# Patient Record
Sex: FEMALE | Race: WHITE | Employment: OTHER | URBAN - METROPOLITAN AREA
[De-identification: names, ages, dates, MRNs, and addresses within clinical notes are randomized per-mention and may not be internally consistent; named-entity substitution may affect disease eponyms.]

---

## 2017-03-06 ENCOUNTER — APPOINTMENT (OUTPATIENT)
Dept: PREADMISSION TESTING | Age: 77
End: 2017-03-06
Payer: MEDICARE

## 2017-03-06 ENCOUNTER — HOSPITAL ENCOUNTER (OUTPATIENT)
Dept: PREADMISSION TESTING | Age: 77
Discharge: HOME | End: 2017-03-06
Payer: MEDICARE

## 2017-03-06 VITALS
DIASTOLIC BLOOD PRESSURE: 84 MMHG | BODY MASS INDEX: 25.95 KG/M2 | SYSTOLIC BLOOD PRESSURE: 158 MMHG | WEIGHT: 141 LBS | HEART RATE: 80 BPM | TEMPERATURE: 98.4 F | HEIGHT: 62 IN

## 2017-03-06 LAB
ABO + RH BLD: NORMAL
ANION GAP BLD CALC-SCNC: 5 MMOL/L (ref 5–15)
APPEARANCE UR: CLEAR
BACTERIA URNS QL MICRO: NEGATIVE /HPF
BILIRUB UR QL: NEGATIVE
BLOOD GROUP ANTIBODIES SERPL: NORMAL
BUN SERPL-MCNC: 16 MG/DL (ref 6–20)
BUN/CREAT SERPL: 26 (ref 12–20)
CALCIUM SERPL-MCNC: 9.4 MG/DL (ref 8.5–10.1)
CHLORIDE SERPL-SCNC: 104 MMOL/L (ref 97–108)
CO2 SERPL-SCNC: 29 MMOL/L (ref 21–32)
COLOR UR: NORMAL
CREAT SERPL-MCNC: 0.62 MG/DL (ref 0.55–1.02)
EPITH CASTS URNS QL MICRO: NORMAL /LPF
ERYTHROCYTE [DISTWIDTH] IN BLOOD BY AUTOMATED COUNT: 13.7 % (ref 11.5–14.5)
EST. AVERAGE GLUCOSE BLD GHB EST-MCNC: 105 MG/DL
GLUCOSE SERPL-MCNC: 97 MG/DL (ref 65–100)
GLUCOSE UR STRIP.AUTO-MCNC: NEGATIVE MG/DL
HBA1C MFR BLD: 5.3 % (ref 4.2–6.3)
HCT VFR BLD AUTO: 43.1 % (ref 35–47)
HGB BLD-MCNC: 14.1 G/DL (ref 11.5–16)
HGB UR QL STRIP: NEGATIVE
HYALINE CASTS URNS QL MICRO: NORMAL /LPF (ref 0–5)
INR PPP: 1 (ref 0.9–1.1)
KETONES UR QL STRIP.AUTO: NEGATIVE MG/DL
LEUKOCYTE ESTERASE UR QL STRIP.AUTO: NEGATIVE
MCH RBC QN AUTO: 30.7 PG (ref 26–34)
MCHC RBC AUTO-ENTMCNC: 32.7 G/DL (ref 30–36.5)
MCV RBC AUTO: 93.7 FL (ref 80–99)
NITRITE UR QL STRIP.AUTO: NEGATIVE
PH UR STRIP: 7 [PH] (ref 5–8)
PLATELET # BLD AUTO: 269 K/UL (ref 150–400)
POTASSIUM SERPL-SCNC: 4.5 MMOL/L (ref 3.5–5.1)
PROT UR STRIP-MCNC: NEGATIVE MG/DL
PROTHROMBIN TIME: 10.4 SEC (ref 9–11.1)
RBC # BLD AUTO: 4.6 M/UL (ref 3.8–5.2)
RBC #/AREA URNS HPF: NORMAL /HPF (ref 0–5)
SODIUM SERPL-SCNC: 138 MMOL/L (ref 136–145)
SP GR UR REFRACTOMETRY: 1.01 (ref 1–1.03)
SPECIMEN EXP DATE BLD: NORMAL
UA: UC IF INDICATED,UAUC: NORMAL
UROBILINOGEN UR QL STRIP.AUTO: 0.2 EU/DL (ref 0.2–1)
WBC # BLD AUTO: 4 K/UL (ref 3.6–11)
WBC URNS QL MICRO: NORMAL /HPF (ref 0–4)

## 2017-03-06 PROCEDURE — 36415 COLL VENOUS BLD VENIPUNCTURE: CPT | Performed by: ORTHOPAEDIC SURGERY

## 2017-03-06 PROCEDURE — 81001 URINALYSIS AUTO W/SCOPE: CPT | Performed by: ORTHOPAEDIC SURGERY

## 2017-03-06 PROCEDURE — 86900 BLOOD TYPING SEROLOGIC ABO: CPT | Performed by: ORTHOPAEDIC SURGERY

## 2017-03-06 PROCEDURE — 85610 PROTHROMBIN TIME: CPT | Performed by: ORTHOPAEDIC SURGERY

## 2017-03-06 PROCEDURE — 80048 BASIC METABOLIC PNL TOTAL CA: CPT | Performed by: ORTHOPAEDIC SURGERY

## 2017-03-06 PROCEDURE — 85027 COMPLETE CBC AUTOMATED: CPT | Performed by: ORTHOPAEDIC SURGERY

## 2017-03-06 PROCEDURE — 83036 HEMOGLOBIN GLYCOSYLATED A1C: CPT | Performed by: ORTHOPAEDIC SURGERY

## 2017-03-06 RX ORDER — MELATONIN
DAILY
COMMUNITY
End: 2019-08-19

## 2017-03-06 RX ORDER — SIMVASTATIN 20 MG/1
TABLET, FILM COATED ORAL
COMMUNITY
End: 2017-06-11 | Stop reason: SDUPTHER

## 2017-03-06 NOTE — PERIOP NOTES
PREOPERATIVE INSTRUCTIONS REVIEWED WITH PATIENT. PATIENT GIVEN   CHG WIPES. INSTRUCTIONS TO BE REVIEWED IN CLASS ON USE OF CHG WIPES. PATIENT GIVEN SSI INFECTION SHEET AND ALSO  MRSA/MSSA TREATMENT INSTRUCTION SHEET  WITH AN EXPLANATION TO PATIENT THAT THEY WILL BE NOTIFIED IF TREATMENT INSTRUCTIONS NEED TO BE INITIATED. PATIENT WAS GIVEN THE OPPORTUNITY TO ASK QUESTIONS ON THE INFORMATION PROVIDED.

## 2017-03-07 LAB
BACTERIA SPEC CULT: ABNORMAL
BACTERIA SPEC CULT: ABNORMAL
SERVICE CMNT-IMP: ABNORMAL

## 2017-03-08 RX ORDER — MUPIROCIN 20 MG/G
OINTMENT TOPICAL 2 TIMES DAILY
Qty: 22 G | Refills: 0 | Status: SHIPPED | OUTPATIENT
Start: 2017-03-13 | End: 2017-03-23

## 2017-03-08 NOTE — ADVANCED PRACTICE NURSE
Patient was called (identity confirmed with 3 patient identifiers) and informed of positive MSSA, instructed to obtain mupirocin prescription and Chlorhexidine liquid soap from pharmacy per protocol. Written instructions given at time of Preadmission Testing were reinforced with patient. Patient was given an opportunity to have questions answered and contact information if needed.

## 2017-03-08 NOTE — PERIOP NOTES
MESSAGE LEFT FOR JASON IN DR. FALLON'S OFFICE RE: MSSA  RESULT AND INITIATION OF TREATMENT BY KINJAL HOLLAND N.P.

## 2017-03-20 RX ORDER — CELECOXIB 200 MG/1
200 CAPSULE ORAL ONCE
Status: CANCELLED | OUTPATIENT
Start: 2017-03-20 | End: 2017-03-20

## 2017-03-20 RX ORDER — DEXAMETHASONE SODIUM PHOSPHATE 100 MG/10ML
10 INJECTION INTRAMUSCULAR; INTRAVENOUS ONCE
Status: CANCELLED | OUTPATIENT
Start: 2017-03-20 | End: 2017-03-20

## 2017-03-20 RX ORDER — ACETAMINOPHEN 500 MG
1000 TABLET ORAL ONCE
Status: CANCELLED | OUTPATIENT
Start: 2017-03-20 | End: 2017-03-20

## 2017-03-20 RX ORDER — PREGABALIN 75 MG/1
75 CAPSULE ORAL ONCE
Status: CANCELLED | OUTPATIENT
Start: 2017-03-20 | End: 2017-03-20

## 2017-03-20 RX ORDER — CEFAZOLIN SODIUM IN 0.9 % NACL 2 G/50 ML
2 INTRAVENOUS SOLUTION, PIGGYBACK (ML) INTRAVENOUS ONCE
Status: CANCELLED | OUTPATIENT
Start: 2017-03-20 | End: 2017-03-20

## 2017-03-21 ENCOUNTER — ANESTHESIA (OUTPATIENT)
Dept: SURGERY | Age: 77
DRG: 470 | End: 2017-03-21
Payer: MEDICARE

## 2017-03-21 ENCOUNTER — ANESTHESIA EVENT (OUTPATIENT)
Dept: SURGERY | Age: 77
DRG: 470 | End: 2017-03-21
Payer: MEDICARE

## 2017-03-21 PROBLEM — M17.12 PRIMARY LOCALIZED OSTEOARTHRITIS OF LEFT KNEE: Status: ACTIVE | Noted: 2017-03-21

## 2017-03-21 PROCEDURE — 74011000258 HC RX REV CODE- 258

## 2017-03-21 PROCEDURE — 74011250636 HC RX REV CODE- 250/636: Performed by: ORTHOPAEDIC SURGERY

## 2017-03-21 PROCEDURE — 77030007866 HC KT SPN ANES BBMI -B: Performed by: ANESTHESIOLOGY

## 2017-03-21 PROCEDURE — 74011000250 HC RX REV CODE- 250

## 2017-03-21 PROCEDURE — 74011250636 HC RX REV CODE- 250/636

## 2017-03-21 PROCEDURE — 77030013079 HC BLNKT BAIR HGGR 3M -A: Performed by: ANESTHESIOLOGY

## 2017-03-21 RX ORDER — ONDANSETRON 2 MG/ML
INJECTION INTRAMUSCULAR; INTRAVENOUS AS NEEDED
Status: DISCONTINUED | OUTPATIENT
Start: 2017-03-21 | End: 2017-03-21 | Stop reason: HOSPADM

## 2017-03-21 RX ORDER — DEXAMETHASONE SODIUM PHOSPHATE 4 MG/ML
INJECTION, SOLUTION INTRA-ARTICULAR; INTRALESIONAL; INTRAMUSCULAR; INTRAVENOUS; SOFT TISSUE AS NEEDED
Status: DISCONTINUED | OUTPATIENT
Start: 2017-03-21 | End: 2017-03-21 | Stop reason: HOSPADM

## 2017-03-21 RX ORDER — SODIUM CHLORIDE, SODIUM LACTATE, POTASSIUM CHLORIDE, CALCIUM CHLORIDE 600; 310; 30; 20 MG/100ML; MG/100ML; MG/100ML; MG/100ML
INJECTION, SOLUTION INTRAVENOUS
Status: DISCONTINUED | OUTPATIENT
Start: 2017-03-21 | End: 2017-03-21 | Stop reason: HOSPADM

## 2017-03-21 RX ORDER — GLYCOPYRROLATE 0.2 MG/ML
INJECTION INTRAMUSCULAR; INTRAVENOUS AS NEEDED
Status: DISCONTINUED | OUTPATIENT
Start: 2017-03-21 | End: 2017-03-21 | Stop reason: HOSPADM

## 2017-03-21 RX ORDER — PROPOFOL 10 MG/ML
INJECTION, EMULSION INTRAVENOUS AS NEEDED
Status: DISCONTINUED | OUTPATIENT
Start: 2017-03-21 | End: 2017-03-21 | Stop reason: HOSPADM

## 2017-03-21 RX ORDER — PROPOFOL 10 MG/ML
INJECTION, EMULSION INTRAVENOUS
Status: DISCONTINUED | OUTPATIENT
Start: 2017-03-21 | End: 2017-03-21 | Stop reason: HOSPADM

## 2017-03-21 RX ORDER — BUPIVACAINE HYDROCHLORIDE 5 MG/ML
INJECTION, SOLUTION EPIDURAL; INTRACAUDAL AS NEEDED
Status: DISCONTINUED | OUTPATIENT
Start: 2017-03-21 | End: 2017-03-21 | Stop reason: HOSPADM

## 2017-03-21 RX ADMIN — PROPOFOL 30 MG: 10 INJECTION, EMULSION INTRAVENOUS at 09:22

## 2017-03-21 RX ADMIN — CEFAZOLIN 2 G: 1 INJECTION, POWDER, FOR SOLUTION INTRAMUSCULAR; INTRAVENOUS; PARENTERAL at 07:52

## 2017-03-21 RX ADMIN — SODIUM CHLORIDE, SODIUM LACTATE, POTASSIUM CHLORIDE, CALCIUM CHLORIDE: 600; 310; 30; 20 INJECTION, SOLUTION INTRAVENOUS at 07:27

## 2017-03-21 RX ADMIN — PROPOFOL 20 MG: 10 INJECTION, EMULSION INTRAVENOUS at 09:03

## 2017-03-21 RX ADMIN — PROPOFOL 50 MCG/KG/MIN: 10 INJECTION, EMULSION INTRAVENOUS at 07:41

## 2017-03-21 RX ADMIN — DEXAMETHASONE SODIUM PHOSPHATE 10 MG: 4 INJECTION, SOLUTION INTRA-ARTICULAR; INTRALESIONAL; INTRAMUSCULAR; INTRAVENOUS; SOFT TISSUE at 07:53

## 2017-03-21 RX ADMIN — GLYCOPYRROLATE 0.2 MG: 0.2 INJECTION INTRAMUSCULAR; INTRAVENOUS at 08:51

## 2017-03-21 RX ADMIN — PROPOFOL 20 MG: 10 INJECTION, EMULSION INTRAVENOUS at 07:37

## 2017-03-21 RX ADMIN — ONDANSETRON 4 MG: 2 INJECTION INTRAMUSCULAR; INTRAVENOUS at 09:27

## 2017-03-21 RX ADMIN — PROPOFOL 10 MG: 10 INJECTION, EMULSION INTRAVENOUS at 07:38

## 2017-03-21 RX ADMIN — BUPIVACAINE HYDROCHLORIDE 15 MG: 5 INJECTION, SOLUTION EPIDURAL; INTRACAUDAL at 07:40

## 2017-03-21 RX ADMIN — PROPOFOL 10 MG: 10 INJECTION, EMULSION INTRAVENOUS at 07:42

## 2017-03-21 RX ADMIN — PROPOFOL 30 MG: 10 INJECTION, EMULSION INTRAVENOUS at 09:20

## 2017-03-21 RX ADMIN — SODIUM CHLORIDE, SODIUM LACTATE, POTASSIUM CHLORIDE, CALCIUM CHLORIDE: 600; 310; 30; 20 INJECTION, SOLUTION INTRAVENOUS at 09:23

## 2017-03-21 RX ADMIN — PROPOFOL 10 MG: 10 INJECTION, EMULSION INTRAVENOUS at 07:39

## 2017-03-21 RX ADMIN — PROPOFOL 30 MG: 10 INJECTION, EMULSION INTRAVENOUS at 09:25

## 2017-03-21 NOTE — ANESTHESIA POSTPROCEDURE EVALUATION
Post-Anesthesia Evaluation and Assessment    Patient: Davion Villa MRN: [id number]  SSN: [id number]    YOB: 1940  Age: 64 y.o. Sex: female       Cardiovascular Function/Vital Signs  Visit Vitals    /65    Pulse (!) 102    Temp 36.4 °C (97.5 °F)    Resp 18    Ht 5' 2\" (1.575 m)    Wt 64 kg (141 lb)    SpO2 99%    BMI 25.79 kg/m2       Patient is status post spinal anesthesia for Procedure(s):  LEFT TOTAL KNEE ARTHROPLASTY (SPINAL W/ IV SEDATION). Nausea/Vomiting: None    Postoperative hydration reviewed and adequate. Pain:  Pain Scale 1: Numeric (0 - 10) (03/21/17 0942)  Pain Intensity 1: 0 (03/21/17 0942)   Managed    Neurological Status:   Neuro (WDL): Exceptions to WDL (03/21/17 0942)  Neuro  Neurologic State: Alert (03/21/17 0942)  LUE Motor Response: Purposeful (03/21/17 0942)  LLE Motor Response: Pharmacologically paralyzed (03/21/17 0942)  RUE Motor Response: Purposeful (03/21/17 0942)  RLE Motor Response: Pharmacologically paralyzed (03/21/17 0942)   At baseline    Mental Status and Level of Consciousness: Arousable    Pulmonary Status:   O2 Device: CO2 nasal cannula;Oral airway (03/21/17 0949)   Adequate oxygenation and airway patent    Complications related to anesthesia: None    Post-anesthesia assessment completed.  No concerns    Signed By: Godfrey Mendez MD     March 21, 2017

## 2017-03-21 NOTE — ANESTHESIA PREPROCEDURE EVALUATION
Anesthetic History   No history of anesthetic complications            Review of Systems / Medical History  Patient summary reviewed, nursing notes reviewed and pertinent labs reviewed    Pulmonary  Within defined limits                 Neuro/Psych   Within defined limits           Cardiovascular  Within defined limits                Exercise tolerance: >4 METS     GI/Hepatic/Renal  Within defined limits   GERD      PUD     Endo/Other  Within defined limits      Arthritis and cancer     Other Findings              Physical Exam    Airway  Mallampati: II  TM Distance: 4 - 6 cm  Neck ROM: normal range of motion   Mouth opening: Normal     Cardiovascular  Regular rate and rhythm,  S1 and S2 normal,  no murmur, click, rub, or gallop             Dental  No notable dental hx       Pulmonary  Breath sounds clear to auscultation               Abdominal  GI exam deferred       Other Findings            Anesthetic Plan    ASA: 2  Anesthesia type: spinal      Post-op pain plan if not by surgeon: peripheral nerve block single    Induction: Intravenous  Anesthetic plan and risks discussed with: Patient

## 2017-03-21 NOTE — ANESTHESIA PROCEDURE NOTES
Peripheral Block    Start time: 3/21/2017 7:19 AM  End time: 3/21/2017 7:29 AM  Performed by: Jeremías Lambert  Authorized by: Jeremías Lambert       Pre-procedure: Indications: at surgeon's request and post-op pain management    Preanesthetic Checklist: patient identified, risks and benefits discussed, site marked, timeout performed, anesthesia consent given and patient being monitored      Block Type:   Block Type:   Adductor canal  Monitoring:  Standard ASA monitoring, responsive to questions, oxygen, continuous pulse ox, frequent vital sign checks and heart rate  Injection Technique:  Single shot  Procedures: ultrasound guided    Patient Position: supine  Prep: chlorhexidine    Location:  Mid thigh  Needle Type:  Stimuplex  Needle Gauge:  22 G  Needle Localization:  Ultrasound guidance  Medication Injected:  0.5%  ropivacaine    Assessment:  Number of attempts:  1  Injection Assessment:  Incremental injection every 5 mL, local visualized surrounding nerve on ultrasound, negative aspiration for blood, no intravascular symptoms, no paresthesia and ultrasound image on chart  Patient tolerance:  Patient tolerated the procedure well with no immediate complications

## 2017-03-22 ENCOUNTER — HOME HEALTH ADMISSION (OUTPATIENT)
Dept: HOME HEALTH SERVICES | Facility: HOME HEALTH | Age: 77
End: 2017-03-22
Payer: MEDICARE

## 2017-03-24 ENCOUNTER — HOME CARE VISIT (OUTPATIENT)
Dept: SCHEDULING | Facility: HOME HEALTH | Age: 77
End: 2017-03-24
Payer: MEDICARE

## 2017-03-24 ENCOUNTER — NURSE NAVIGATOR (OUTPATIENT)
Dept: OTHER | Age: 77
End: 2017-03-24

## 2017-03-24 VITALS
TEMPERATURE: 98 F | HEART RATE: 80 BPM | SYSTOLIC BLOOD PRESSURE: 118 MMHG | RESPIRATION RATE: 20 BRPM | OXYGEN SATURATION: 97 % | DIASTOLIC BLOOD PRESSURE: 68 MMHG

## 2017-03-24 PROCEDURE — G0151 HHCP-SERV OF PT,EA 15 MIN: HCPCS

## 2017-03-24 PROCEDURE — 3331090002 HH PPS REVENUE DEBIT

## 2017-03-24 PROCEDURE — 3331090001 HH PPS REVENUE CREDIT

## 2017-03-24 PROCEDURE — G0299 HHS/HOSPICE OF RN EA 15 MIN: HCPCS

## 2017-03-24 PROCEDURE — 400013 HH SOC

## 2017-03-24 NOTE — PROGRESS NOTES
This note will not be viewable in [address] Post Discharge Follow-up contact after Joint Replacement    Patient discharged on 3/23/17  By  Wero Cintron   following  left knee Arthroplasty. Spoke with patient today, who reports they are \" working with physical therapy right now. I am just surprised by the bruising\"  Denies Fever, Shortness of Breath or Chest Pain. Home Health has visited. Patient also reports:. Incision  clean, dry, intact  Calf is non-tender,   operative extremity has minimal swelling. Pain is well managed. Discussed use of ice & elevation. is progressing with therapy and isexercising on own. Taking Aspirin for anticoagulation, Tramadol for pain. Patient   is not experiencing symptoms of constipation & urinating without difficulty. Discussed side effects of anticoagulants & pain medications (bleeding/bruising, constipation, lightheaded/dizziness)  Follow up appointment is not scheduled; but patient states plan to schedule. Discussed calling surgeon Dr Ronnie Jimenez  for drainage, bleeding, swelling in operative extremity, fever or pain. Discussed calling PCP Dr Laila Renteria with other medical issues.

## 2017-03-25 PROCEDURE — 3331090002 HH PPS REVENUE DEBIT

## 2017-03-25 PROCEDURE — 3331090001 HH PPS REVENUE CREDIT

## 2017-03-26 PROCEDURE — 3331090002 HH PPS REVENUE DEBIT

## 2017-03-26 PROCEDURE — 3331090001 HH PPS REVENUE CREDIT

## 2017-03-27 ENCOUNTER — HOME CARE VISIT (OUTPATIENT)
Dept: SCHEDULING | Facility: HOME HEALTH | Age: 77
End: 2017-03-27
Payer: MEDICARE

## 2017-03-27 VITALS
TEMPERATURE: 97.9 F | DIASTOLIC BLOOD PRESSURE: 76 MMHG | OXYGEN SATURATION: 98 % | RESPIRATION RATE: 24 BRPM | HEART RATE: 73 BPM | SYSTOLIC BLOOD PRESSURE: 124 MMHG

## 2017-03-27 PROCEDURE — 3331090001 HH PPS REVENUE CREDIT

## 2017-03-27 PROCEDURE — G0151 HHCP-SERV OF PT,EA 15 MIN: HCPCS

## 2017-03-27 PROCEDURE — 3331090002 HH PPS REVENUE DEBIT

## 2017-03-28 PROCEDURE — 3331090002 HH PPS REVENUE DEBIT

## 2017-03-28 PROCEDURE — 3331090001 HH PPS REVENUE CREDIT

## 2017-03-29 ENCOUNTER — HOME CARE VISIT (OUTPATIENT)
Dept: SCHEDULING | Facility: HOME HEALTH | Age: 77
End: 2017-03-29
Payer: MEDICARE

## 2017-03-29 VITALS
SYSTOLIC BLOOD PRESSURE: 127 MMHG | DIASTOLIC BLOOD PRESSURE: 78 MMHG | TEMPERATURE: 98.1 F | HEART RATE: 83 BPM | RESPIRATION RATE: 20 BRPM | OXYGEN SATURATION: 98 %

## 2017-03-29 PROCEDURE — 3331090002 HH PPS REVENUE DEBIT

## 2017-03-29 PROCEDURE — G0151 HHCP-SERV OF PT,EA 15 MIN: HCPCS

## 2017-03-29 PROCEDURE — 3331090001 HH PPS REVENUE CREDIT

## 2017-03-30 PROCEDURE — 3331090001 HH PPS REVENUE CREDIT

## 2017-03-30 PROCEDURE — 3331090002 HH PPS REVENUE DEBIT

## 2017-03-31 ENCOUNTER — HOME CARE VISIT (OUTPATIENT)
Dept: SCHEDULING | Facility: HOME HEALTH | Age: 77
End: 2017-03-31
Payer: MEDICARE

## 2017-03-31 VITALS
TEMPERATURE: 98.3 F | OXYGEN SATURATION: 98 % | HEART RATE: 98 BPM | SYSTOLIC BLOOD PRESSURE: 125 MMHG | DIASTOLIC BLOOD PRESSURE: 82 MMHG | RESPIRATION RATE: 24 BRPM

## 2017-03-31 PROCEDURE — 3331090001 HH PPS REVENUE CREDIT

## 2017-03-31 PROCEDURE — 3331090002 HH PPS REVENUE DEBIT

## 2017-03-31 PROCEDURE — G0151 HHCP-SERV OF PT,EA 15 MIN: HCPCS

## 2017-04-01 PROCEDURE — 3331090001 HH PPS REVENUE CREDIT

## 2017-04-01 PROCEDURE — 3331090002 HH PPS REVENUE DEBIT

## 2017-04-02 PROCEDURE — 3331090001 HH PPS REVENUE CREDIT

## 2017-04-02 PROCEDURE — 3331090002 HH PPS REVENUE DEBIT

## 2017-04-03 ENCOUNTER — HOME CARE VISIT (OUTPATIENT)
Dept: SCHEDULING | Facility: HOME HEALTH | Age: 77
End: 2017-04-03
Payer: MEDICARE

## 2017-04-03 VITALS
HEART RATE: 78 BPM | HEIGHT: 62 IN | WEIGHT: 147 LBS | DIASTOLIC BLOOD PRESSURE: 60 MMHG | OXYGEN SATURATION: 98 % | TEMPERATURE: 97.8 F | SYSTOLIC BLOOD PRESSURE: 116 MMHG | BODY MASS INDEX: 27.05 KG/M2 | RESPIRATION RATE: 20 BRPM

## 2017-04-03 VITALS
SYSTOLIC BLOOD PRESSURE: 130 MMHG | TEMPERATURE: 98.1 F | OXYGEN SATURATION: 98 % | RESPIRATION RATE: 20 BRPM | DIASTOLIC BLOOD PRESSURE: 80 MMHG | HEART RATE: 89 BPM

## 2017-04-03 PROCEDURE — 3331090001 HH PPS REVENUE CREDIT

## 2017-04-03 PROCEDURE — 3331090002 HH PPS REVENUE DEBIT

## 2017-04-03 PROCEDURE — G0151 HHCP-SERV OF PT,EA 15 MIN: HCPCS

## 2017-04-04 PROCEDURE — 3331090001 HH PPS REVENUE CREDIT

## 2017-04-04 PROCEDURE — 3331090002 HH PPS REVENUE DEBIT

## 2017-04-05 ENCOUNTER — HOME CARE VISIT (OUTPATIENT)
Dept: SCHEDULING | Facility: HOME HEALTH | Age: 77
End: 2017-04-05
Payer: MEDICARE

## 2017-04-05 VITALS
OXYGEN SATURATION: 98 % | DIASTOLIC BLOOD PRESSURE: 88 MMHG | RESPIRATION RATE: 21 BRPM | HEART RATE: 89 BPM | SYSTOLIC BLOOD PRESSURE: 130 MMHG | TEMPERATURE: 98.8 F

## 2017-04-05 PROCEDURE — 3331090002 HH PPS REVENUE DEBIT

## 2017-04-05 PROCEDURE — 3331090001 HH PPS REVENUE CREDIT

## 2017-04-05 PROCEDURE — G0151 HHCP-SERV OF PT,EA 15 MIN: HCPCS

## 2017-04-06 PROCEDURE — 3331090001 HH PPS REVENUE CREDIT

## 2017-04-06 PROCEDURE — 3331090002 HH PPS REVENUE DEBIT

## 2017-04-07 ENCOUNTER — HOME CARE VISIT (OUTPATIENT)
Dept: SCHEDULING | Facility: HOME HEALTH | Age: 77
End: 2017-04-07
Payer: MEDICARE

## 2017-04-07 VITALS
HEART RATE: 84 BPM | TEMPERATURE: 98.5 F | RESPIRATION RATE: 22 BRPM | DIASTOLIC BLOOD PRESSURE: 79 MMHG | OXYGEN SATURATION: 98 % | SYSTOLIC BLOOD PRESSURE: 127 MMHG

## 2017-04-07 PROCEDURE — G0151 HHCP-SERV OF PT,EA 15 MIN: HCPCS

## 2017-04-07 PROCEDURE — 3331090001 HH PPS REVENUE CREDIT

## 2017-04-07 PROCEDURE — 3331090002 HH PPS REVENUE DEBIT

## 2017-04-08 PROCEDURE — 3331090001 HH PPS REVENUE CREDIT

## 2017-04-08 PROCEDURE — 3331090002 HH PPS REVENUE DEBIT

## 2017-04-09 PROCEDURE — 3331090002 HH PPS REVENUE DEBIT

## 2017-04-09 PROCEDURE — 3331090001 HH PPS REVENUE CREDIT

## 2017-04-10 ENCOUNTER — HOME CARE VISIT (OUTPATIENT)
Dept: SCHEDULING | Facility: HOME HEALTH | Age: 77
End: 2017-04-10
Payer: MEDICARE

## 2017-04-10 VITALS
OXYGEN SATURATION: 98 % | RESPIRATION RATE: 22 BRPM | HEART RATE: 81 BPM | SYSTOLIC BLOOD PRESSURE: 127 MMHG | DIASTOLIC BLOOD PRESSURE: 76 MMHG | TEMPERATURE: 98.7 F

## 2017-04-10 PROCEDURE — 3331090002 HH PPS REVENUE DEBIT

## 2017-04-10 PROCEDURE — G0151 HHCP-SERV OF PT,EA 15 MIN: HCPCS

## 2017-04-10 PROCEDURE — 3331090001 HH PPS REVENUE CREDIT

## 2017-04-11 PROCEDURE — 3331090001 HH PPS REVENUE CREDIT

## 2017-04-11 PROCEDURE — 3331090002 HH PPS REVENUE DEBIT

## 2017-04-12 ENCOUNTER — HOME CARE VISIT (OUTPATIENT)
Dept: SCHEDULING | Facility: HOME HEALTH | Age: 77
End: 2017-04-12
Payer: MEDICARE

## 2017-04-12 PROCEDURE — 3331090001 HH PPS REVENUE CREDIT

## 2017-04-12 PROCEDURE — G0151 HHCP-SERV OF PT,EA 15 MIN: HCPCS

## 2017-04-12 PROCEDURE — 3331090002 HH PPS REVENUE DEBIT

## 2017-04-13 VITALS
TEMPERATURE: 98.5 F | DIASTOLIC BLOOD PRESSURE: 84 MMHG | SYSTOLIC BLOOD PRESSURE: 120 MMHG | RESPIRATION RATE: 24 BRPM | HEART RATE: 83 BPM | OXYGEN SATURATION: 98 %

## 2017-04-13 PROCEDURE — 3331090002 HH PPS REVENUE DEBIT

## 2017-04-13 PROCEDURE — 3331090001 HH PPS REVENUE CREDIT

## 2017-04-14 ENCOUNTER — HOME CARE VISIT (OUTPATIENT)
Dept: SCHEDULING | Facility: HOME HEALTH | Age: 77
End: 2017-04-14
Payer: MEDICARE

## 2017-04-14 VITALS
DIASTOLIC BLOOD PRESSURE: 80 MMHG | TEMPERATURE: 98.4 F | RESPIRATION RATE: 21 BRPM | HEART RATE: 78 BPM | SYSTOLIC BLOOD PRESSURE: 123 MMHG | OXYGEN SATURATION: 98 %

## 2017-04-14 PROCEDURE — G0151 HHCP-SERV OF PT,EA 15 MIN: HCPCS

## 2017-04-14 PROCEDURE — 3331090001 HH PPS REVENUE CREDIT

## 2017-04-14 PROCEDURE — 3331090002 HH PPS REVENUE DEBIT

## 2017-04-15 PROCEDURE — 3331090002 HH PPS REVENUE DEBIT

## 2017-04-15 PROCEDURE — 3331090001 HH PPS REVENUE CREDIT

## 2017-04-16 PROCEDURE — 3331090002 HH PPS REVENUE DEBIT

## 2017-04-16 PROCEDURE — 3331090001 HH PPS REVENUE CREDIT

## 2017-04-17 ENCOUNTER — HOME CARE VISIT (OUTPATIENT)
Dept: SCHEDULING | Facility: HOME HEALTH | Age: 77
End: 2017-04-17
Payer: MEDICARE

## 2017-04-17 VITALS
HEART RATE: 78 BPM | OXYGEN SATURATION: 98 % | SYSTOLIC BLOOD PRESSURE: 126 MMHG | TEMPERATURE: 98.3 F | DIASTOLIC BLOOD PRESSURE: 84 MMHG | RESPIRATION RATE: 22 BRPM

## 2017-04-17 PROCEDURE — 3331090003 HH PPS REVENUE ADJ

## 2017-04-17 PROCEDURE — G0151 HHCP-SERV OF PT,EA 15 MIN: HCPCS

## 2017-04-17 PROCEDURE — 3331090001 HH PPS REVENUE CREDIT

## 2017-04-17 PROCEDURE — 3331090002 HH PPS REVENUE DEBIT

## 2017-04-27 ENCOUNTER — HOSPITAL ENCOUNTER (OUTPATIENT)
Dept: PHYSICAL THERAPY | Age: 77
Discharge: HOME | End: 2017-04-27
Payer: MEDICARE

## 2017-04-27 PROCEDURE — 97161 PT EVAL LOW COMPLEX 20 MIN: CPT | Performed by: PHYSICAL THERAPIST

## 2017-04-27 PROCEDURE — G8979 MOBILITY GOAL STATUS: HCPCS | Performed by: PHYSICAL THERAPIST

## 2017-04-27 PROCEDURE — G8978 MOBILITY CURRENT STATUS: HCPCS | Performed by: PHYSICAL THERAPIST

## 2017-04-27 PROCEDURE — 97110 THERAPEUTIC EXERCISES: CPT | Performed by: PHYSICAL THERAPIST

## 2017-04-27 PROCEDURE — 97016 VASOPNEUMATIC DEVICE THERAPY: CPT | Performed by: PHYSICAL THERAPIST

## 2017-04-27 PROCEDURE — 97140 MANUAL THERAPY 1/> REGIONS: CPT | Performed by: PHYSICAL THERAPIST

## 2017-04-27 NOTE — PROGRESS NOTES
[facility]  [address] [address], [address] [place] [address]  Phone: [phone]  Fax: [phone]      Plan of Care/Statement of Necessity for Physical Therapy Services  2-15    Patient name: Mihaela Chadwick  : 1940  Provider#: [id number]  Referral source: Selina Brumfield MD      Medical/Treatment Diagnosis: Pain in left knee [M25.562]     Prior Hospitalization: see medical history     Comorbidities: osteoporosis, OA  Prior Level of Function: hiking frequently  Medications: Verified on Patient Summary List  Start of Care: 17      Onset Date: DOS: 3/21/17  The Plan of Care and following information is based on the information from the initial evaluation. Assessment/ key information: The pt is a 64 y.o. Female referred for the evaluation and treatment s/p left knee TKA on 3/21/17 by Dr. Chauncey Claude. The pt presents with impairments including decreased rom, strength, balance and joint mobility s/p surgical procedure. She is ambulating with a single point cane with decreased heel strike and limitation into knee extension. The pt would benefit from skilled physical therapy in order to address these impairments and to return her/him to maximal level of function pain free.       Evaluation Complexity History MEDIUM  Complexity : 1-2 comorbidities / personal factors will impact the outcome/ POC ; Examination LOW Complexity : 1-2 Standardized tests and measures addressing body structure, function, activity limitation and / or participation in recreation  ;Presentation LOW Complexity : Stable, uncomplicated  ;Clinical Decision Making MEDIUM Complexity : FOTO score of 26-74  Overall Complexity Rating: LOW     Problem List: pain affecting function, decrease ROM, decrease strength, edema affecting function, impaired gait/ balance, decrease ADL/ functional abilitiies, decrease activity tolerance and decrease flexibility/ joint mobility   Treatment Plan may include any combination of the following: Therapeutic exercise, Neuromuscular re-education, Physical agent/modality, Gait/balance training, Manual therapy, Patient education, Home safety training and Stair training  Patient / Family readiness to learn indicated by: asking questions, trying to perform skills and interest  Persons(s) to be included in education: patient (P)  Barriers to Learning/Limitations: None  Patient Goal (s): be able to go hiking  Patient Self Reported Health Status: good  Rehabilitation Potential: excellent    Short Term Goals: To be accomplished in 4 weeks:  1) Pt will be able toNavigate 3 flights of stairs without pain   2) Pt will be able to Ambulate on uneven terrain without pain or instability without her cane   3) Pt will be able to Ambulate >/= 2 miles without pain   4) Pt will demonstrate Knee flexion >/= 120 degrees to aid in sitting/squatting  Long Term Goals: To be accomplished in 8 weeks:  1) Pt will be able toNavigate 5 flights of stairs without pain   2) Pt will be able to Ambulate on an incline of 1- 2% without pain or instability without her cane   3) Pt will be able to Ambulate >/= 3 miles without pain   4) Pt will demonstrate Knee flexion >/= 125 degrees to aid in sitting/squatting   Frequency / Duration: Patient to be seen 2-3 times per week for 8 weeks. Patient/ Caregiver education and instruction: activity modification and exercises    [x]  Plan of care has been reviewed with PTA    G-Codes (GP)  Mobility   Current  CK= 40-59%   Goal  CJ= 20-39%      The severity rating is based on clinical judgment and the FOTO Score score. Certification Period: 4/27/17-5/27/17  Melly May 4/27/2017 1:59 PM    ________________________________________________________________________    I certify that the above Therapy Services are being furnished while the patient is under my care. I agree with the treatment plan and certify that this therapy is necessary.     Physician's Signature:____________________ Date:____________Time: _________

## 2017-04-27 NOTE — PROGRESS NOTES
PT INITIAL EVALUATION NOTE - [facility] 2-15    Patient Name: Eros Smith  Date:2017  : 1940  [x]  Patient  Verified  Payor: Jose Truong / Plan: Megan Salgado PPO / Product Type: PPO /    In time:12:30p  Out time:1:40p  Total Treatment Time (min):  70  Total Timed Codes (min): 60  1:1 Treatment Time ( only): 60  Visit #: 1      Treatment Area: Pain in left knee [M25.562]    SUBJECTIVE  Pain Level (0-10 scale): 1/10  Any medication changes, allergies to medications, adverse drug reactions, diagnosis change, or new procedure performed?: [] No    [x] Yes (see summary sheet for update)  Subjective: The pt had TKA on 3/21/17 by Dr. Zhanna Roberts. No complications noted per pt. The pt reports she had PT at home and went well. She lives in [address] [place] [address] and has about 17 steps to enter home and is getting better using her left leg with these. The pt has been walking 1 mile with her  and has done it without the cane 1-2 times, has more trouble standing still. Last measurement was 104 degrees with home health. She has continued exercises on her own. PLOF: hiking trails before knee was too bad. esMechanism of Injury: OA  Previous Treatment/Compliance: had PT prior to surgery. PMHx/Surgical Hx: TKA left knee 3/21/17  Work Hx: retired  Living Situation: home with  and  Pt Goals: pt wishes to be able to walk 4-5 miles. [email or web address]. Barriers: -  Motivation: high  Substance use: none  FABQ Score: Cognition: A & O x 4      OBJECTIVE/EXAMINATION  Observations: Pt shows good incision healing with skin glue gradually pealing off. Increased girth in knee L > R  Gait and Functional Mobility: decreased heel strike due to lack of full extension, walking with Single point cane community distances. Palpation: tenderness along medial lateral incision and posterior knee.      Right Knee ROM: AROM  0/0/125   Left Knee ROM: AROM  0/10/109       Joint Mobility Assessment: PFJ limited    Flexibility: decreased gastroc and soleus flexibility limiting extension. MMT: 4/5 left LE, 5/5 right LE. Neurological: Reflexes / Sensations: intact          Modality rationale: decrease edema, decrease inflammation and decrease pain to improve the patients ability to ambulate without limtiations   Min Type Additional Details    [] Estim: []Att   []Unatt        []TENS instruct                  []IFC  []Premod   []NMES                     []Other:  []w/US   []w/ice   []w/heat  Position:  Location:    []  Traction: [] Cervical       []Lumbar                       [] Prone          []Supine                       []Intermittent   []Continuous Lbs:  [] before manual  [] after manual  []w/heat    []  Ultrasound: []Continuous   [] Pulsed at:                           []1MHz   []3MHz Location:  W/cm2:    [] Paraffin         Location:   []w/heat    []  Ice     []  Heat  []  Ice massage Position:  Location:    []  Laser  []  Other: Position:  Location:   10   [x]  Vasopneumatic Device Pressure:       [x] lo [] med [] hi   Temperature: 45     [x] Skin assessment post-treatment:  [x]intact []redness- no adverse reaction    []redness  adverse reaction:     20 min Therapeutic Exercise:  [x] See flow sheet :   Rationale: increase ROM, increase strength, improve coordination, improve balance and increase proprioception to improve the patients ability to return to hiking. 10 min Manual Therapy: manual PFJ mobilizations and posterior knee STM to address mobility and extension. Rationale: decrease pain, increase ROM and increase tissue extensibility to improve the patients ability to return to hiking pain free.        With   [] TE   [] TA   [] neuro   [] other: Patient Education: [x] Review HEP    [] Progressed/Changed HEP based on:   [] positioning   [] body mechanics   [] transfers   [] heat/ice application    [] other:      Other Objective/Functional Measures:FOTO 49 GOAL 60    Pain Level (0-10 scale) post treatment: 2/10    ASSESSMENT/Changes in Function:     [x]  See Plan of Casey Duarte 4/27/2017  12:28 PM

## 2017-05-03 ENCOUNTER — HOSPITAL ENCOUNTER (OUTPATIENT)
Dept: PHYSICAL THERAPY | Age: 77
Discharge: HOME | End: 2017-05-03
Payer: MEDICARE

## 2017-05-03 PROCEDURE — 97110 THERAPEUTIC EXERCISES: CPT | Performed by: PHYSICAL THERAPIST

## 2017-05-03 PROCEDURE — 97016 VASOPNEUMATIC DEVICE THERAPY: CPT | Performed by: PHYSICAL THERAPIST

## 2017-05-03 PROCEDURE — 97140 MANUAL THERAPY 1/> REGIONS: CPT | Performed by: PHYSICAL THERAPIST

## 2017-05-03 NOTE — PROGRESS NOTES
PT DAILY TREATMENT NOTE - [facility] 2-15    Patient Name: Tyler Dobson  Date:5/3/2017  : 1940  [x]  Patient  Verified  Payor: Freddy Smith / Plan: Deja Prophet PPO / Product Type: PPO /    In time:10:00am  Out time:11:15am  Total Treatment Time (min): 75  Total Timed Codes (min): 45  1:1 Treatment Time [facility] only):45  Visit #: 2    Treatment Area: Pain in left knee [M25.562]    SUBJECTIVE  Pain Level (0-10 scale): 3-4/10  Any medication changes, allergies to medications, adverse drug reactions, diagnosis change, or new procedure performed?: [x] No    [] Yes (see summary sheet for update)  Subjective functional status/changes:   [] No changes reported  Doing well, was up on my feet a lot yesterday so I am a little sore and swollen this morning.      OBJECTIVE              Modality rationale: decrease edema, decrease inflammation and decrease pain to improve the patients ability to ambulate without limtiations   Min Type Additional Details     [] Estim: []Att []Unatt []TENS instruct  []IFC []Premod []NMES   []Other:  []w/US []w/ice []w/heat  Position:  Location:     [] Traction: [] Cervical []Lumbar  [] Prone []Supine  []Intermittent []Continuous Lbs:  [] before manual  [] after manual  []w/heat     [] Ultrasound: []Continuous [] Pulsed at:  []1MHz []3MHz Location:  W/cm2:     [] Paraffin      Location:   []w/heat     [] Ice [] Heat  [] Ice massage Position:  Location:     [] Laser  [] Other: Position:  Location:   15 [x] Vasopneumatic Device Pressure: [x] lo [] med [] hi   Temperature: 45      [x] Skin assessment post-treatment: [x]intact []redness- no adverse reaction  []redness  adverse reaction:      20 min Therapeutic Exercise: [x] See flow sheet :   Rationale: increase ROM, increase strength, improve coordination, improve balance and increase proprioception to improve the patients ability to return to hiking.      10 min Manual Therapy: manual PFJ mobilizations and posterior knee STM to address mobility and extension. Rationale: decrease pain, increase ROM and increase tissue extensibility to improve the patients ability to return to hiking pain free.         With  [] TE  [] TA  [] neuro  [] other: Patient Education: [x] Review HEP   [] Progressed/Changed HEP based on:   [] positioning [] body mechanics [] transfers [] heat/ice application   [] other:           Other Objective/Functional Measures: -     Pain Level (0-10 scale) post treatment: 1/10    ASSESSMENT/Changes in Function:   Pt tolerated session well with no increase in pain and progression with strengthening exercises. Patient will continue to benefit from skilled PT services to modify and progress therapeutic interventions, address functional mobility deficits, address ROM deficits, address strength deficits, analyze and address soft tissue restrictions, analyze and cue movement patterns, analyze and modify body mechanics/ergonomics, assess and modify postural abnormalities, address imbalance/dizziness and instruct in home and community integration to attain remaining goals. [x]  See Plan of Care  []  See progress note/recertification  []  See Discharge Summary         Progress towards goals / Updated goals:  Short Term Goals: To be accomplished in 4 weeks:  1) Pt will be able to Navigate 3 flights of stairs without pain   2) Pt will be able to Ambulate on uneven terrain without pain or instability without her cane   3) Pt will be able to Ambulate >/= 2 miles without pain   4) Pt will demonstrate Knee flexion >/= 120 degrees to aid in sitting/squatting  Long Term Goals:  To be accomplished in 8 weeks:  1) Pt will be able toNavigate 5 flights of stairs without pain   2) Pt will be able to Ambulate on an incline of 1- 2% without pain or instability without her cane   3) Pt will be able to Ambulate >/= 3 miles without pain   4) Pt will demonstrate Knee flexion >/= 125 degrees to aid in sitting/squatting   Frequency / Duration: Patient to be seen 2-3 times per week for 8 weeks.       PLAN  []  Upgrade activities as tolerated     []  Continue plan of care  []  Update interventions per flow sheet       []  Discharge due to:_  []  Other:_      Rekha Main 5/3/2017  11:25 AM

## 2017-05-05 ENCOUNTER — HOSPITAL ENCOUNTER (OUTPATIENT)
Dept: PHYSICAL THERAPY | Age: 77
Discharge: HOME | End: 2017-05-05
Payer: MEDICARE

## 2017-05-05 PROCEDURE — 97140 MANUAL THERAPY 1/> REGIONS: CPT | Performed by: PHYSICAL THERAPIST

## 2017-05-05 PROCEDURE — 97016 VASOPNEUMATIC DEVICE THERAPY: CPT | Performed by: PHYSICAL THERAPIST

## 2017-05-05 PROCEDURE — 97110 THERAPEUTIC EXERCISES: CPT | Performed by: PHYSICAL THERAPIST

## 2017-05-05 NOTE — PROGRESS NOTES
PT DAILY TREATMENT NOTE - [facility] 2-15    Patient Name: Tyler Dobson  Date:2017  : 1940  [x]  Patient  Verified  Payor: Freddy Smith / Plan: Deja Prophet PPO / Product Type: PPO /    In time:10:00a  Out time:11:15a  Total Treatment Time (min): 75  Total Timed Codes (min): 75  1:1 Treatment Time ([facility] only): 75  Visit #: 3    Treatment Area: Pain in left knee [M25.562]    SUBJECTIVE  Pain Level (0-10 scale): 310  Any medication changes, allergies to medications, adverse drug reactions, diagnosis change, or new procedure performed?: [x] No    [] Yes (see summary sheet for update)  Subjective functional status/changes:   [] No changes reported  tight this morning. Did a lot yesterday, but tried to do my exercises. OBJECTIVE                   Modality rationale: decrease edema, decrease inflammation and decrease pain to improve the patients ability to ambulate without limtiations   Min Type Additional Details      [] Estim: []Att []Unatt []TENS instruct  []IFC []Premod []NMES   []Other:  []w/US []w/ice []w/heat  Position:  Location:      [] Traction: [] Cervical []Lumbar  [] Prone []Supine  []Intermittent []Continuous Lbs:  [] before manual  [] after manual  []w/heat      [] Ultrasound: []Continuous [] Pulsed at:  []1MHz []3MHz Location:  W/cm2:      [] Paraffin      Location:   []w/heat      [] Ice [] Heat  [] Ice massage Position:  Location:      [] Laser  [] Other: Position:  Location:   15 [x] Vasopneumatic Device Pressure: [x] lo [] med [] hi   Temperature: 39       [x] Skin assessment post-treatment: [x]intact []redness- no adverse reaction  []redness  adverse reaction:       45 min Therapeutic Exercise: [x] See flow sheet :   Rationale: increase ROM, increase strength, improve coordination, improve balance and increase proprioception to improve the patients ability to return to hiking.       15 min Manual Therapy: manual PFJ mobilizations and posterior knee STM to address mobility and extension. Manual HS stretching. Rationale: decrease pain, increase ROM and increase tissue extensibility to improve the patients ability to return to hiking pain free.           With  [] TE  [] TA  [] neuro  [] other: Patient Education: [x] Review HEP   [] Progressed/Changed HEP based on:   [] positioning [] body mechanics [] transfers [] heat/ice application   [] other:              Other Objective/Functional Measures: -      Pain Level (0-10 scale) post treatment: 1/10     ASSESSMENT/Changes in Function:   Pt was tight this morning and was lacking extension with walking in. Improved following manual therapy and also discussed HEP stretching and modified her form.      Patient will continue to benefit from skilled PT services to modify and progress therapeutic interventions, address functional mobility deficits, address ROM deficits, address strength deficits, analyze and address soft tissue restrictions, analyze and cue movement patterns, analyze and modify body mechanics/ergonomics, assess and modify postural abnormalities, address imbalance/dizziness and instruct in home and community integration to attain remaining goals.      [x] See Plan of Care  [] See progress note/recertification  [] See Discharge Summary      Progress towards goals / Updated goals:  Short Term Goals: To be accomplished in 4 weeks:  1) Pt will be able to Navigate 3 flights of stairs without pain   2) Pt will be able to Ambulate on uneven terrain without pain or instability without her cane   3) Pt will be able to Ambulate >/= 2 miles without pain   4) Pt will demonstrate Knee flexion >/= 120 degrees to aid in sitting/squatting  Long Term Goals:  To be accomplished in 8 weeks:  1) Pt will be able toNavigate 5 flights of stairs without pain   2) Pt will be able to Ambulate on an incline of 1- 2% without pain or instability without her cane   3) Pt will be able to Ambulate >/= 3 miles without pain   4) Pt will demonstrate Knee flexion >/= 125 degrees to aid in sitting/squatting   Frequency / Duration: Patient to be seen 2-3 times per week for 8 weeks.        PLAN  [] Upgrade activities as tolerated [] Continue plan of care  [] Update interventions per flow sheet   [] Discharge due to:_  [] Other:_     Radha Abdi 5/5/2017  10:40 AM

## 2017-05-09 ENCOUNTER — APPOINTMENT (OUTPATIENT)
Dept: PHYSICAL THERAPY | Age: 77
End: 2017-05-09
Payer: MEDICARE

## 2017-05-09 ENCOUNTER — HOSPITAL ENCOUNTER (OUTPATIENT)
Dept: PHYSICAL THERAPY | Age: 77
Discharge: HOME | End: 2017-05-09
Payer: MEDICARE

## 2017-05-09 PROCEDURE — 97140 MANUAL THERAPY 1/> REGIONS: CPT | Performed by: PHYSICAL THERAPIST

## 2017-05-09 PROCEDURE — 97110 THERAPEUTIC EXERCISES: CPT | Performed by: PHYSICAL THERAPIST

## 2017-05-09 PROCEDURE — 97016 VASOPNEUMATIC DEVICE THERAPY: CPT | Performed by: PHYSICAL THERAPIST

## 2017-05-09 NOTE — PROGRESS NOTES
PT DAILY TREATMENT NOTE - [facility] 2-15    Patient Name: Sophia Jensen  Date:2017  : 1940  [x]  Patient  Verified  Payor: Lillie Hernández / Plan: Moni Man PPO / Product Type: PPO /    In time:3:00p Out time:4:05p  Total Treatment Time (min): 65  Total Timed Codes (min): 60  1:1 Treatment Time ( only): 61  Visit #: 4    Treatment Area: Pain in left knee [M25.562]    SUBJECTIVE  Pain Level (0-10 scale): 1/10  Any medication changes, allergies to medications, adverse drug reactions, diagnosis change, or new procedure performed?: [x] No    [] Yes (see summary sheet for update)  Subjective functional status/changes:   [] No changes reported  Doing well, had a busy day yesterday and do not feel as swollen today    OBJECTIVE                        Modality rationale: decrease edema, decrease inflammation and decrease pain to improve the patients ability to ambulate without limtiations   Min Type Additional Details      [] Estim: []Att []Unatt []TENS instruct  []IFC []Premod []NMES   []Other:  []w/US []w/ice []w/heat  Position:  Location:      [] Traction: [] Cervical []Lumbar  [] Prone []Supine  []Intermittent []Continuous Lbs:  [] before manual  [] after manual  []w/heat      [] Ultrasound: []Continuous [] Pulsed at:  []1MHz []3MHz Location:  W/cm2:      [] Paraffin      Location:   []w/heat      [] Ice [] Heat  [] Ice massage Position:  Location:      [] Laser  [] Other: Position:  Location:   15 [x] Vasopneumatic Device Pressure: [x] lo [] med [] hi   Temperature: 39       [x] Skin assessment post-treatment: [x]intact []redness- no adverse reaction  []redness  adverse reaction:       30 min Therapeutic Exercise: [x] See flow sheet :   Rationale: increase ROM, increase strength, improve coordination, improve balance and increase proprioception to improve the patients ability to return to hiking.       15 min Manual Therapy: manual PFJ mobilizations and posterior knee STM to address mobility and extension.  Manual HS stretching. Rationale: decrease pain, increase ROM and increase tissue extensibility to improve the patients ability to return to hiking pain free.           With  [] TE  [] TA  [] neuro  [] other: Patient Education: [x] Review HEP   [] Progressed/Changed HEP based on:   [] positioning [] body mechanics [] transfers [] heat/ice application   [] other:                Other Objective/Functional Measures: 125 in short sitting.      Pain Level (0-10 scale) post treatment: 1/10      ASSESSMENT/Changes in Function:   Pt continues to do well with exercises and progressing with walking. Walking 2x per day for 1 mile.       Patient will continue to benefit from skilled PT services to modify and progress therapeutic interventions, address functional mobility deficits, address ROM deficits, address strength deficits, analyze and address soft tissue restrictions, analyze and cue movement patterns, analyze and modify body mechanics/ergonomics, assess and modify postural abnormalities, address imbalance/dizziness and instruct in home and community integration to attain remaining goals.      [x] See Plan of Care  [] See progress note/recertification  [] See Discharge Summary      Progress towards goals / Updated goals:  Short Term Goals: To be accomplished in 4 weeks:  1) Pt will be able to Navigate 3 flights of stairs without pain   2) Pt will be able to Ambulate on uneven terrain without pain or instability without her cane   3) Pt will be able to Ambulate >/= 2 miles without pain   4) Pt will demonstrate Knee flexion >/= 120 degrees to aid in sitting/squatting  Long Term Goals:  To be accomplished in 8 weeks:  1) Pt will be able toNavigate 5 flights of stairs without pain   2) Pt will be able to Ambulate on an incline of 1- 2% without pain or instability without her cane   3) Pt will be able to Ambulate >/= 3 miles without pain   4) Pt will demonstrate Knee flexion >/= 125 degrees to aid in sitting/squatting   Frequency / Duration: Patient to be seen 2-3 times per week for 8 weeks.          PLAN  [] Upgrade activities as tolerated [] Continue plan of care  [] Update interventions per flow sheet   [] Discharge due to:Jesse Garcia 5/9/2017  3:53 PM

## 2017-05-11 ENCOUNTER — APPOINTMENT (OUTPATIENT)
Dept: PHYSICAL THERAPY | Age: 77
End: 2017-05-11
Payer: MEDICARE

## 2017-05-16 ENCOUNTER — HOSPITAL ENCOUNTER (OUTPATIENT)
Dept: PHYSICAL THERAPY | Age: 77
Discharge: HOME | End: 2017-05-16
Payer: MEDICARE

## 2017-05-16 PROCEDURE — 97016 VASOPNEUMATIC DEVICE THERAPY: CPT | Performed by: PHYSICAL THERAPIST

## 2017-05-16 PROCEDURE — 97110 THERAPEUTIC EXERCISES: CPT | Performed by: PHYSICAL THERAPIST

## 2017-05-16 PROCEDURE — 97140 MANUAL THERAPY 1/> REGIONS: CPT | Performed by: PHYSICAL THERAPIST

## 2017-05-16 NOTE — PROGRESS NOTES
PT DAILY TREATMENT NOTE - [facility] 2-15    Patient Name: Meenu Moreau  Date:2017  : 1940  [x]  Patient  Verified  Payor: Ludy Cramer / Plan: Indu Colin PPO / Product Type: PPO /    In time:10:30a  Out time:11:40  Total Treatment Time (min):70  Total Timed Codes (min): 70  1:1 Treatment Time ( [address] only): 79  Visit #: 5    Treatment Area: Pain in left knee [M25.562]    SUBJECTIVE  Pain Level (0-10 scale): 1/10  Any medication changes, allergies to medications, adverse drug reactions, diagnosis change, or new procedure performed?: [x] No    [] Yes (see summary sheet for update)  Subjective functional status/changes:   [] No changes reported  Doing well. Had a good weekend and able to walk and drive on her trip this past weekend in the mountains.      OBJECTIVE                        Modality rationale: decrease edema, decrease inflammation and decrease pain to improve the patients ability to ambulate without limtiations   Min Type Additional Details      [] Estim: []Att []Unatt []TENS instruct  []IFC []Premod []NMES   []Other:  []w/US []w/ice []w/heat  Position:  Location:      [] Traction: [] Cervical []Lumbar  [] Prone []Supine  []Intermittent []Continuous Lbs:  [] before manual  [] after manual  []w/heat      [] Ultrasound: []Continuous [] Pulsed at:  []1MHz []3MHz Location:  W/cm2:      [] Paraffin      Location:   []w/heat      [] Ice [] Heat  [] Ice massage Position:  Location:      [] Laser  [] Other: Position:  Location:   10 [x] Vasopneumatic Device Pressure: [x] lo [] med [] hi   Temperature: 39       [x] Skin assessment post-treatment: [x]intact []redness- no adverse reaction  []redness  adverse reaction:       45 min Therapeutic Exercise: [x] See flow sheet :   Rationale: increase ROM, increase strength, improve coordination, improve balance and increase proprioception to improve the patients ability to return to hiking.       15 min Manual Therapy: manual PFJ mobilizations and posterior knee STM to address mobility and extension. Manual HS stretching. Rationale: decrease pain, increase ROM and increase tissue extensibility to improve the patients ability to return to hiking pain free.           With  [] TE  [] TA  [] neuro  [] other: Patient Education: [x] Review HEP   [] Progressed/Changed HEP based on:   [] positioning [] body mechanics [] transfers [] heat/ice application   [] other:                Other Objective/Functional Measures: 125 in short sitting.      Pain Level (0-10 scale) post treatment: 1/10      ASSESSMENT/Changes in Function:   Pt did well and was able to negotiate inclines in the mountains this weekend and did a lot of walking. Gait pattern and ROM has significantly improved      Patient will continue to benefit from skilled PT services to modify and progress therapeutic interventions, address functional mobility deficits, address ROM deficits, address strength deficits, analyze and address soft tissue restrictions, analyze and cue movement patterns, analyze and modify body mechanics/ergonomics, assess and modify postural abnormalities, address imbalance/dizziness and instruct in home and community integration to attain remaining goals.      [x] See Plan of Care  [] See progress note/recertification  [] See Discharge Summary      Progress towards goals / Updated goals:  Short Term Goals: To be accomplished in 4 weeks:  1) Pt will be able to Navigate 3 flights of stairs without pain   2) Pt will be able to Ambulate on uneven terrain without pain or instability without her cane   3) Pt will be able to Ambulate >/= 2 miles without pain   4) Pt will demonstrate Knee flexion >/= 120 degrees to aid in sitting/squatting  Long Term Goals:  To be accomplished in 8 weeks:  1) Pt will be able toNavigate 5 flights of stairs without pain   2) Pt will be able to Ambulate on an incline of 1- 2% without pain or instability without her cane   3) Pt will be able to Ambulate >/= 3 miles without pain   4) Pt will demonstrate Knee flexion >/= 125 degrees to aid in sitting/squatting   Frequency / Duration: Patient to be seen 2-3 times per week for 8 weeks.          PLAN  [] Upgrade activities as tolerated [] Continue plan of care  [] Update interventions per flow sheet   [] Discharge due to:Jesse Fordore Alyce 5/16/2017  12:27 PM

## 2017-05-18 ENCOUNTER — HOSPITAL ENCOUNTER (OUTPATIENT)
Dept: PHYSICAL THERAPY | Age: 77
Discharge: HOME | End: 2017-05-18
Payer: MEDICARE

## 2017-05-18 PROCEDURE — 97140 MANUAL THERAPY 1/> REGIONS: CPT | Performed by: PHYSICAL THERAPIST

## 2017-05-18 PROCEDURE — 97110 THERAPEUTIC EXERCISES: CPT | Performed by: PHYSICAL THERAPIST

## 2017-05-18 NOTE — PROGRESS NOTES
PT DAILY TREATMENT NOTE - [facility] 2-15    Patient Name: Duglas Roberts  Date:2017  : 1940  [x]  Patient  Verified  Payor: Rowan Mohs / Plan: Armani Olson PPO / Product Type: PPO /    In time:10:30 Out time:11:20a  Total Treatment Time (min): 50  Total Timed Codes (min): 40  1:1 Treatment Time ( only): 40  Visit #: 6    Treatment Area: Pain in left knee [M25.562]    SUBJECTIVE  Pain Level (0-10 scale): 0/10  Any medication changes, allergies to medications, adverse drug reactions, diagnosis change, or new procedure performed?: [x] No    [] Yes (see summary sheet for update)  Subjective functional status/changes:   [] No changes reported  Doing well. No new complaints. Knee is feeling good. OBJECTIVE                        Modality rationale: decrease edema, decrease inflammation and decrease pain to improve the patients ability to ambulate without limtiations   Min Type Additional Details      [] Estim: []Att []Unatt []TENS instruct  []IFC []Premod []NMES   []Other:  []w/US []w/ice []w/heat  Position:  Location:      [] Traction: [] Cervical []Lumbar  [] Prone []Supine  []Intermittent []Continuous Lbs:  [] before manual  [] after manual  []w/heat      [] Ultrasound: []Continuous [] Pulsed at:  []1MHz []3MHz Location:  W/cm2:      [] Paraffin      Location:   []w/heat      [] Ice [] Heat  [] Ice massage Position:  Location:      [] Laser  [] Other: Position:  Location:   - [x] Vasopneumatic Device Pressure: [x] lo [] med [] hi   Temperature: 39       [x] Skin assessment post-treatment: [x]intact []redness- no adverse reaction  []redness  adverse reaction:       40 min Therapeutic Exercise: [x] See flow sheet :   Rationale: increase ROM, increase strength, improve coordination, improve balance and increase proprioception to improve the patients ability to return to hiking.       10 min Manual Therapy: manual PFJ mobilizations and posterior knee STM to address mobility and extension. Manual HS stretching. Rationale: decrease pain, increase ROM and increase tissue extensibility to improve the patients ability to return to hiking pain free.           With  [] TE  [] TA  [] neuro  [] other: Patient Education: [x] Review HEP   [] Progressed/Changed HEP based on:   [] positioning [] body mechanics [] transfers [] heat/ice application   [] other:                Other Objective/Functional Measures: 125 in short sitting.      Pain Level (0-10 scale) post treatment: 1/10      ASSESSMENT/Changes in Function:   Pt did well and was able to negotiate inclines in the mountains this weekend and did a lot of walking. Gait pattern and ROM has significantly improved      Patient will continue to benefit from skilled PT services to modify and progress therapeutic interventions, address functional mobility deficits, address ROM deficits, address strength deficits, analyze and address soft tissue restrictions, analyze and cue movement patterns, analyze and modify body mechanics/ergonomics, assess and modify postural abnormalities, address imbalance/dizziness and instruct in home and community integration to attain remaining goals.      [x] See Plan of Care  [] See progress note/recertification  [] See Discharge Summary      Progress towards goals / Updated goals:  Short Term Goals: To be accomplished in 4 weeks:  1) Pt will be able to Navigate 3 flights of stairs without pain   2) Pt will be able to Ambulate on uneven terrain without pain or instability without her cane   3) Pt will be able to Ambulate >/= 2 miles without pain   4) Pt will demonstrate Knee flexion >/= 120 degrees to aid in sitting/squatting  Long Term Goals:  To be accomplished in 8 weeks:  1) Pt will be able toNavigate 5 flights of stairs without pain   2) Pt will be able to Ambulate on an incline of 1- 2% without pain or instability without her cane   3) Pt will be able to Ambulate >/= 3 miles without pain   4) Pt will demonstrate Knee flexion >/= 125 degrees to aid in sitting/squatting   Frequency / Duration: Patient to be seen 2-3 times per week for 8 weeks.          PLAN  [] Upgrade activities as tolerated [] Continue plan of care  [] Update interventions per flow sheet   [] Discharge due to:_    Rekha Quach 5/18/2017  11:20 AM

## 2017-05-23 ENCOUNTER — APPOINTMENT (OUTPATIENT)
Dept: PHYSICAL THERAPY | Age: 77
End: 2017-05-23
Payer: MEDICARE

## 2017-05-25 ENCOUNTER — HOSPITAL ENCOUNTER (OUTPATIENT)
Dept: PHYSICAL THERAPY | Age: 77
Discharge: HOME | End: 2017-05-25
Payer: MEDICARE

## 2017-05-25 PROCEDURE — 97140 MANUAL THERAPY 1/> REGIONS: CPT | Performed by: PHYSICAL THERAPIST

## 2017-05-25 PROCEDURE — 97110 THERAPEUTIC EXERCISES: CPT | Performed by: PHYSICAL THERAPIST

## 2017-05-25 NOTE — PROGRESS NOTES
PT DAILY TREATMENT NOTE - [facility] 2-15    Patient Name: Rob Dubose  Date:2017  : 1940  [x]  Patient  Verified  Payor: Gómez Stanton / Plan: Talia Parekh PPO / Product Type: PPO /    In time:10:35a  Out time:11:45a  Total Treatment Time (min): 70  Total Timed Codes (min):55  1:1 Treatment Time ( [address] only): 54  Visit #: 7    Treatment Area: Pain in left knee [M25.562]    SUBJECTIVE  Pain Level (0-10 scale): 0/10  Any medication changes, allergies to medications, adverse drug reactions, diagnosis change, or new procedure performed?: [x] No    [] Yes (see summary sheet for update)  Subjective functional status/changes:   [] No changes reported  The pt reports she went to the Doctor yesterday and he said she can continue as long as she wants, but she is doing really well.      OBJECTIVE                        Modality rationale: decrease edema, decrease inflammation and decrease pain to improve the patients ability to ambulate without limtiations   Min Type Additional Details      [] Estim: []Att []Unatt []TENS instruct  []IFC []Premod []NMES   []Other:  []w/US []w/ice []w/heat  Position:  Location:      [] Traction: [] Cervical []Lumbar  [] Prone []Supine  []Intermittent []Continuous Lbs:  [] before manual  [] after manual  []w/heat      [] Ultrasound: []Continuous [] Pulsed at:  []1MHz []3MHz Location:  W/cm2:      [] Paraffin      Location:   []w/heat      [] Ice [] Heat  [] Ice massage Position:  Location:      [] Laser  [] Other: Position:  Location:   - [x] Vasopneumatic Device Pressure: [x] lo [] med [] hi   Temperature: 39       [x] Skin assessment post-treatment: [x]intact []redness- no adverse reaction  []redness  adverse reaction:       40 min Therapeutic Exercise: [x] See flow sheet :   Rationale: increase ROM, increase strength, improve coordination, improve balance and increase proprioception to improve the patients ability to return to hiking.       15 min Manual Therapy: manual PFJ mobilizations and posterior knee STM to address mobility and extension. Manual HS stretching. Rationale: decrease pain, increase ROM and increase tissue extensibility to improve the patients ability to return to hiking pain free.           With  [] TE  [] TA  [] neuro  [] other: Patient Education: [x] Review HEP   [] Progressed/Changed HEP based on:   [] positioning [] body mechanics [] transfers [] heat/ice application   [] other:                Other Objective/Functional Measures: -      Pain Level (0-10 scale) post treatment: 0/10      ASSESSMENT/Changes in Function:   Pt progressed with supine clams and is progressing with resistance of exercises and balance. Knee extension is improving and walking endurance continues to improve.       Patient will continue to benefit from skilled PT services to modify and progress therapeutic interventions, address functional mobility deficits, address ROM deficits, address strength deficits, analyze and address soft tissue restrictions, analyze and cue movement patterns, analyze and modify body mechanics/ergonomics, assess and modify postural abnormalities, address imbalance/dizziness and instruct in home and community integration to attain remaining goals.      [x] See Plan of Care  [] See progress note/recertification  [] See Discharge Summary      Progress towards goals / Updated goals:  Short Term Goals: To be accomplished in 4 weeks:  1) Pt will be able to Navigate 3 flights of stairs without pain MET  2) Pt will be able to Ambulate on uneven terrain without pain or instability without her cane MET  3) Pt will be able to Ambulate >/= 2 miles without pain MET  4) Pt will demonstrate Knee flexion >/= 120 degrees to aid in sitting/squattingMET  Long Term Goals:  To be accomplished in 8 weeks:  1) Pt will be able toNavigate 5 flights of stairs without pain   2) Pt will be able to Ambulate on an incline of 1- 2% without pain or instability without her cane   3) Pt will be able to Ambulate >/= 3 miles without pain   4) Pt will demonstrate Knee flexion >/= 125 degrees to aid in sitting/squatting   Frequency / Duration: Patient to be seen 2-3 times per week for 8 weeks.          PLAN  [] Upgrade activities as tolerated [x] Continue plan of care  [] Update interventions per flow sheet   [] Discharge due to:Jesse Garcia 5/25/2017  11:56 AM

## 2017-05-30 ENCOUNTER — HOSPITAL ENCOUNTER (OUTPATIENT)
Dept: PHYSICAL THERAPY | Age: 77
Discharge: HOME | End: 2017-05-30
Payer: MEDICARE

## 2017-05-30 PROCEDURE — 97110 THERAPEUTIC EXERCISES: CPT | Performed by: PHYSICAL THERAPIST

## 2017-05-30 PROCEDURE — 97140 MANUAL THERAPY 1/> REGIONS: CPT | Performed by: PHYSICAL THERAPIST

## 2017-05-30 NOTE — PROGRESS NOTES
PT DAILY TREATMENT NOTE - [facility] 2-15    Patient Name: Joey Close  Date:2017  : 1940  [x]  Patient  Verified  Payor: Lei Stearns / Plan: Becky Bhatia PPO / Product Type: PPO /    In time:10:35  Out time:11:45a  Total Treatment Time (min): 70  Total Timed Codes (min): 30  1:1 Treatment Time ( only): 30  Visit #: 8    Treatment Area: Pain in left knee [M25.562]    SUBJECTIVE  Pain Level (0-10 scale): 0/10  Any medication changes, allergies to medications, adverse drug reactions, diagnosis change, or new procedure performed?: [x] No    [] Yes (see summary sheet for update)  Subjective functional status/changes:   [] No changes reported  Doing well, able to walk 2 miles this weekend and felt great. Enquiring about [place] that is a total 1 hour and 15 minutes.      OBJECTIVE                        Modality rationale: decrease edema, decrease inflammation and decrease pain to improve the patients ability to ambulate without limtiations   Min Type Additional Details      [] Estim: []Att []Unatt []TENS instruct  []IFC []Premod []NMES   []Other:  []w/US []w/ice []w/heat  Position:  Location:      [] Traction: [] Cervical []Lumbar  [] Prone []Supine  []Intermittent []Continuous Lbs:  [] before manual  [] after manual  []w/heat      [] Ultrasound: []Continuous [] Pulsed at:  []1MHz []3MHz Location:  W/cm2:      [] Paraffin      Location:   []w/heat      [] Ice [] Heat  [] Ice massage Position:  Location:      [] Laser  [] Other: Position:  Location:   - [x] Vasopneumatic Device Pressure: [x] lo [] med [] hi   Temperature: 39       [x] Skin assessment post-treatment: [x]intact []redness- no adverse reaction  []redness  adverse reaction:       50 min Therapeutic Exercise: [x] See flow sheet :   Rationale: increase ROM, increase strength, improve coordination, improve balance and increase proprioception to improve the patients ability to return to hiking.       20 min Manual Therapy: manual PFJ mobilizations and posterior knee STM to address mobility and extension. Manual HS stretching. Rationale: decrease pain, increase ROM and increase tissue extensibility to improve the patients ability to return to hiking pain free.           With  [] TE  [] TA  [] neuro  [] other: Patient Education: [x] Review HEP   [] Progressed/Changed HEP based on:   [] positioning [] body mechanics [] transfers [] heat/ice application   [] other:                Other Objective/Functional Measures: 0/0/120 (prone passive) 0/0/126 (short sitting active)      Pain Level (0-10 scale) post treatment: 0/10      ASSESSMENT/Changes in Function:   Pt improving with endurance and strengthening.       Patient will continue to benefit from skilled PT services to modify and progress therapeutic interventions, address functional mobility deficits, address ROM deficits, address strength deficits, analyze and address soft tissue restrictions, analyze and cue movement patterns, analyze and modify body mechanics/ergonomics, assess and modify postural abnormalities, address imbalance/dizziness and instruct in home and community integration to attain remaining goals.      [x] See Plan of Care  [] See progress note/recertification  [] See Discharge Summary      Progress towards goals / Updated goals:  Short Term Goals: To be accomplished in 4 weeks:  1) Pt will be able to Navigate 3 flights of stairs without pain MET  2) Pt will be able to Ambulate on uneven terrain without pain or instability without her cane MET  3) Pt will be able to Ambulate >/= 2 miles without pain MET  4) Pt will demonstrate Knee flexion >/= 120 degrees to aid in sitting/squatting MET    Long Term Goals:  To be accomplished in 8 weeks:  1) Pt will be able toNavigate 5 flights of stairs without pain   2) Pt will be able to Ambulate on an incline of 1- 2% without pain or instability without her cane   3) Pt will be able to Ambulate >/= 3 miles without pain   4) Pt will demonstrate Knee flexion >/= 125 degrees to aid in sitting/squatting   Frequency / Duration: Patient to be seen 2-3 times per week for 8 weeks.          PLAN  [] Upgrade activities as tolerated [x] Continue plan of care  [] Update interventions per flow sheet   [] Discharge due to:Jesse Ivory 5/30/2017  12:04 PM

## 2017-06-08 ENCOUNTER — HOSPITAL ENCOUNTER (OUTPATIENT)
Dept: PHYSICAL THERAPY | Age: 77
Discharge: HOME | End: 2017-06-08
Payer: MEDICARE

## 2017-06-08 PROCEDURE — 97110 THERAPEUTIC EXERCISES: CPT | Performed by: PHYSICAL THERAPIST

## 2017-06-08 PROCEDURE — G8978 MOBILITY CURRENT STATUS: HCPCS | Performed by: PHYSICAL THERAPIST

## 2017-06-08 PROCEDURE — G8979 MOBILITY GOAL STATUS: HCPCS | Performed by: PHYSICAL THERAPIST

## 2017-06-08 PROCEDURE — 97140 MANUAL THERAPY 1/> REGIONS: CPT | Performed by: PHYSICAL THERAPIST

## 2017-06-08 NOTE — PROGRESS NOTES
[facility] [place] [facility]   [address] [address], [address] [place] [address]  Phone: [phone] Fax: [phone]    Progress Note      Shelby Lantigua Nnamdi1940   Merrill Leach MD   Provider # [id number]                    Diagnosis: Pain in left knee [M25.562]  Onset Date:3/21/17     Visits from Start of Care: 9     Missed Visits: 0  Start of Care: 4/27/17  Prior Level of Function: exercising/walking/hiking daily      Assessment:   Pt is progressing well with ROM, balance, strength and endurance. She has met her ROM goal and we continue to progress towards strength and endurance goals. 0/0/120 (prone passive) 0/0/126 (short sitting active). She is able to walk 2 miles without complaints of pain and has been able to hike 1 x with an incline. We plan to continue 1x per week for 2-3 weeks working towards discharge. The Plan of Care and following information is based on the patient's current status:    Progress towards goals / Updated goals:  Short Term Goals: To be accomplished in 4 weeks:  1) Pt will be able to Navigate 3 flights of stairs without pain MET  2) Pt will be able to Ambulate on uneven terrain without pain or instability without her cane MET  3) Pt will be able to Ambulate >/= 2 miles without pain MET  4) Pt will demonstrate Knee flexion >/= 120 degrees to aid in sitting/squatting MET      Long Term Goals:  To be accomplished in 8 weeks:  1) Pt will be able toNavigate 5 flights of stairs without pain MET   2) Pt will be able to Ambulate on an incline of 1- 2% without pain or instability without her cane Progressing  3) Pt will be able to Ambulate >/= 3 miles without pain Progressing  4) Pt will demonstrate Knee flexion >/= 125 degrees to aid in sitting/squatting MET  Frequency / Duration: Patient to be seen 2-3 times per week for 8 weeks.            Problem List: decrease strength, impaired gait/ balance, decrease activity tolerance and decrease flexibility/ joint mobility    Treatment Plan: Therapeutic exercise, Neuromuscular re-education, Gait/balance training, Manual therapy, Patient education, Functional mobility training and Stair training     Patient Goal (s) has been updated and includes: Pt has MET all short term goals and is progressing with endurance and strength each week to attain her LTGs. G-Codes (GP)  Mobility  [id number] Current  CJ= 20-39%  [id number] Goal  CI= 1-19%      The severity rating is based on the FOTO Score and clinical judgement. Jessica Whitt 6/8/2017 2:01 PM    ________________________________________________________________________  NOTE TO PHYSICIAN:  Please complete the following and fax to: [facility]: Fax: [phone]. Carla Pearl Retain this original for your records. If you are unable to process this request in 24 hours, please contact our office.        ____ I have read the above report and request that my patient continue therapy with the following changes/special instructions:  ____ I have read the above report and request that my patient be discharged from therapy    Physician's Signature:_________________ Date:___________Time:__________

## 2017-06-08 NOTE — PROGRESS NOTES
PT DAILY TREATMENT NOTE - [facility] 2-15    Patient Name: Philipp Boston  Date:2017  : 1940  [x]  Patient  Verified  Payor: [employer] / Plan: Janiya Corcoran PPO / Product Type: PPO /    In time: 9:00a Out time:9:50a  Total Treatment Time (min): 50  Total Timed Codes (min): 50  1:1 Treatment Time ( only): 25  Visit #: 9    Treatment Area: Pain in left knee [M25.562]    SUBJECTIVE  Pain Level (0-10 scale): 0/10  Any medication changes, allergies to medications, adverse drug reactions, diagnosis change, or new procedure performed?: [x] No    [] Yes (see summary sheet for update)  Subjective functional status/changes:   [] No changes reported  Doing well although under the weather this week with a cold and 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     OBJECTIVE                        Modality rationale: decrease edema, decrease inflammation and decrease pain to improve the patients ability to ambulate without limtiations   Min Type Additional Details      [] Estim: []Att []Unatt []TENS instruct  []IFC []Premod []NMES   []Other:  []w/US []w/ice []w/heat  Position:  Location:      [] Traction: [] Cervical []Lumbar  [] Prone []Supine  []Intermittent []Continuous Lbs:  [] before manual  [] after manual  []w/heat      [] Ultrasound: []Continuous [] Pulsed at:  []1MHz []3MHz Location:  W/cm2:      [] Paraffin      Location:   []w/heat      [] Ice [] Heat  [] Ice massage Position:  Location:      [] Laser  [] Other: Position:  Location:   - [x] Vasopneumatic Device Pressure: [x] lo [] med [] hi   Temperature: 39       [x] Skin assessment post-treatment: [x]intact []redness- no adverse reaction  []redness  adverse reaction:       30 min Therapeutic Exercise: [x] See flow sheet :   Rationale: increase ROM, increase strength, improve coordination, improve balance and increase proprioception to improve the patients ability to return to hiking.       20 min Manual Therapy: manual PFJ mobilizations and posterior knee STM to address mobility and extension. Manual HS stretching. Rationale: decrease pain, increase ROM and increase tissue extensibility to improve the patients ability to return to hiking pain free.           With  [] TE  [] TA  [] neuro  [] other: Patient Education: [x] Review HEP   [] Progressed/Changed HEP based on:   [] positioning [] body mechanics [] transfers [] heat/ice application   [] other:                Other Objective/Functional Measures: 0/0/120 (prone passive) 0/0/126 (short sitting active)      Pain Level (0-10 scale) post treatment: 0/10      ASSESSMENT/Changes in Function:   Pt did well today and ROM was full given she was sitting a lot more over the past week than usual. Still limited prior to manual PT on terminal extension, but improves with manual therapy.       Patient will continue to benefit from skilled PT services to modify and progress therapeutic interventions, address functional mobility deficits, address ROM deficits, address strength deficits, analyze and address soft tissue restrictions, analyze and cue movement patterns, analyze and modify body mechanics/ergonomics, assess and modify postural abnormalities, address imbalance/dizziness and instruct in home and community integration to attain remaining goals.      [x] See Plan of Care  [] See progress note/recertification  [] See Discharge Summary      Progress towards goals / Updated goals:  Short Term Goals: To be accomplished in 4 weeks:  1) Pt will be able to Navigate 3 flights of stairs without pain MET  2) Pt will be able to Ambulate on uneven terrain without pain or instability without her cane MET  3) Pt will be able to Ambulate >/= 2 miles without pain MET  4) Pt will demonstrate Knee flexion >/= 120 degrees to aid in sitting/squatting MET     Long Term Goals:  To be accomplished in 8 weeks:  1) Pt will be able toNavigate 5 flights of stairs without pain   2) Pt will be able to Ambulate on an incline of 1- 2% without pain or instability without her cane   3) Pt will be able to Ambulate >/= 3 miles without pain   4) Pt will demonstrate Knee flexion >/= 125 degrees to aid in sitting/squatting   Frequency / Duration: Patient to be seen 2-3 times per week for 8 weeks.          PLAN  [] Upgrade activities as tolerated [x] Continue plan of care  [] Update interventions per flow sheet   [] Discharge due to:_    Tiffany Miss 6/8/2017  1:19 PM

## 2017-06-13 ENCOUNTER — HOSPITAL ENCOUNTER (OUTPATIENT)
Dept: PHYSICAL THERAPY | Age: 77
Discharge: HOME | End: 2017-06-13
Payer: MEDICARE

## 2017-06-13 PROCEDURE — 97140 MANUAL THERAPY 1/> REGIONS: CPT | Performed by: PHYSICAL THERAPIST

## 2017-06-13 PROCEDURE — 97110 THERAPEUTIC EXERCISES: CPT | Performed by: PHYSICAL THERAPIST

## 2017-06-13 NOTE — PROGRESS NOTES
PT DAILY TREATMENT NOTE - [facility] 2-15    Patient Name: Munir Dhillon  Date:2017  : 1940  [x]  Patient  Verified  Payor: Yuriy Stallings / Plan: Jigna Huston PPO / Product Type: PPO /    In time:9;00a  Out time:10;00a  Total Treatment Time (min): 60  Total Timed Codes (min): 60  1:1 Treatment Time ( only): 60  Visit #: 10    Treatment Area: Pain in left knee [M25.562]    SUBJECTIVE  Pain Level (0-10 scale):0/10  Any medication changes, allergies to medications, adverse drug reactions, diagnosis change, or new procedure performed?: [x] No    [] Yes (see summary sheet for update)  Subjective functional status/changes:   [] No changes reported  Doing well. [place] [employer] [place] and did much better on Saturday compared to 3 weeks ago. OBJECTIVE             40 min Therapeutic Exercise: [x] See flow sheet :   Rationale: increase ROM, increase strength, improve coordination, improve balance and increase proprioception to improve the patients ability to return to hiking.       20 min Manual Therapy: manual PFJ mobilizations and posterior knee STM to address mobility and extension. Manual HS stretching. Rationale: decrease pain, increase ROM and increase tissue extensibility to improve the patients ability to return to hiking pain free.           With  [] TE  [] TA  [] neuro  [] other: Patient Education: [x] Review HEP   [] Progressed/Changed HEP based on:   [] positioning [] body mechanics [] transfers [] heat/ice application   [] other:                Other Objective/Functional Measures: 0/0/120 (prone passive) 0/0/126 (short sitting active)      Pain Level (0-10 scale) post treatment: 0/10      ASSESSMENT/Changes in Function:   Pt was able to progress to SLS on foam and S/m squats in the mirror.  Able to walk further without fatigue and progressing in the last few weeks.       Patient will continue to benefit from skilled PT services to modify and progress therapeutic interventions, address functional mobility deficits, address ROM deficits, address strength deficits, analyze and address soft tissue restrictions, analyze and cue movement patterns, analyze and modify body mechanics/ergonomics, assess and modify postural abnormalities, address imbalance/dizziness and instruct in home and community integration to attain remaining goals.      [x] See Plan of Care  [] See progress note/recertification  [] See Discharge Summary      Progress towards goals / Updated goals:  Short Term Goals: To be accomplished in 4 weeks:  1) Pt will be able to Navigate 3 flights of stairs without pain MET  2) Pt will be able to Ambulate on uneven terrain without pain or instability without her cane MET  3) Pt will be able to Ambulate >/= 2 miles without pain MET  4) Pt will demonstrate Knee flexion >/= 120 degrees to aid in sitting/squatting MET      Long Term Goals:  To be accomplished in 8 weeks:  1) Pt will be able toNavigate 5 flights of stairs without pain   2) Pt will be able to Ambulate on an incline of 1- 2% without pain or instability without her cane   3) Pt will be able to Ambulate >/= 3 miles without pain   4) Pt will demonstrate Knee flexion >/= 125 degrees to aid in sitting/squatting   Frequency / Duration: Patient to be seen 2-3 times per week for 8 weeks.          PLAN  [] Upgrade activities as tolerated [x] Continue plan of care  [] Update interventions per flow sheet   [] Discharge due to:Jesse    Ellen Ivory 6/13/2017  10:04 AM

## 2017-06-20 ENCOUNTER — HOSPITAL ENCOUNTER (OUTPATIENT)
Dept: PHYSICAL THERAPY | Age: 77
Discharge: HOME | End: 2017-06-20
Payer: MEDICARE

## 2017-06-20 PROCEDURE — 97140 MANUAL THERAPY 1/> REGIONS: CPT | Performed by: PHYSICAL THERAPIST

## 2017-06-20 PROCEDURE — 97110 THERAPEUTIC EXERCISES: CPT | Performed by: PHYSICAL THERAPIST

## 2017-06-20 NOTE — PROGRESS NOTES
PT DAILY TREATMENT NOTE - [facility] 2-15    Patient Name: Blas Calabrese  Date:2017  : 1940  [x]  Patient  Verified  Payor: Leslie Cadet / Plan: Ruthann Clinton PPO / Product Type: PPO /    In time: 9:00aOut time:10:25a  Total Treatment Time (min): 85min  Total Timed Codes (min): 85  1:1 Treatment Time ([address] only): 54  Visit #: 11    Treatment Area: Pain in left knee [M25.562]    SUBJECTIVE  Pain Level (0-10 scale): 0/10  Any medication changes, allergies to medications, adverse drug reactions, diagnosis change, or new procedure performed?: [x] No    [] Yes (see summary sheet for update)  Subjective functional status/changes:   [] No changes reported  Have not been able to progress with walking as much this week due to schedule, but knee is feeling good. OBJECTIVE              65 min Therapeutic Exercise: [x] See flow sheet :   Rationale: increase ROM, increase strength, improve coordination, improve balance and increase proprioception to improve the patients ability to return to hiking.       20 min Manual Therapy: manual PFJ mobilizations and posterior knee STM to address mobility and extension. Manual HS stretching. Rationale: decrease pain, incease ROM and increase tissue extensibility to improve the patients ability to return to hiking pain free.           With  [] TE  [] TA  [] neuro  [] other: Patient Education: [x] Review HEP   [] Progressed/Changed HEP based on:   [] positioning [] body mechanics [] transfers [] heat/ice application   [] other:                Other Objective/Functional Measures: 0/0/120 (prone passive) 0/0/126 (short sitting active)      Pain Level (0-10 scale) post treatment: 0/10      ASSESSMENT/Changes in Function:   Pt has made great progress since initial session and able to walk greater than 2 miles and one slight incline when she was hiking recently. She has progressed with stair climbing and has full extension and flexion to 125 degrees in sitting.    We continue to address balance for safety and endurance as her goal is to hike 5 miles. Patient will continue to benefit from skilled PT services to modify and progress therapeutic interventions, address functional mobility deficits, address ROM deficits, address strength deficits, analyze and address soft tissue restrictions, analyze and cue movement patterns, analyze and modify body mechanics/ergonomics, assess and modify postural abnormalities, address imbalance/dizziness and instruct in home and community integration to attain remaining goals.      [x] See Plan of Care  [] See progress note/recertification  [] See Discharge Summary      Progress towards goals / Updated goals:  Short Term Goals: To be accomplished in 4 weeks:  1) Pt will be able to Navigate 3 flights of stairs without pain MET  2) Pt will be able to Ambulate on uneven terrain without pain or instability without her cane MET  3) Pt will be able to Ambulate >/= 2 miles without pain MET  4) Pt will demonstrate Knee flexion >/= 120 degrees to aid in sitting/squatting MET      Long Term Goals:  To be accomplished in 8 weeks:  1) Pt will be able toNavigate 5 flights of stairs without pain MET  2) Pt will be able to Ambulate on an incline of 1- 2% without pain or instability without her cane MET  3) Pt will be able to Ambulate >/= 3 miles without pain   4) Pt will demonstrate Knee flexion >/= 125 degrees to aid in sitting/squatting MET  Frequency / Duration: Patient to be seen 2-3 times per week for 8 weeks.          PLAN  [] Upgrade activities as tolerated [x] Continue plan of care  [] Update interventions per flow sheet   [] Discharge due to:Jesse [place] 6/20/2017  11:16 AM

## 2017-06-20 NOTE — PROGRESS NOTES
[facility]   [address] [address]  [facility]  Phone: [phone] Fax: [phone]    Progress Note    Name: Duglas Roberts   : 1940   MD: Sophia Pino MD       Treatment Diagnosis: Pain in left knee [M25.562]  Start of Care: 17    Visits from Start of Care: 11  Missed Visits: 0    Summary of Care: The pt has been seen for 10 sessions at [place] [address] and is making excellent progress with ROM, strength, balance and endurance. Pt has made great progress since initial session and able to walk greater than 2 miles and one slight incline when she was hiking recently. She has progressed with stair climbing and has full extension and flexion to 125 degrees in sitting. We continue to address balance for safety and endurance as her goal is to hike 5 miles. Assessment / Recommendations:     Progress towards goals / Updated goals:  Short Term Goals: To be accomplished in 4 weeks:  1) Pt will be able to Navigate 3 flights of stairs without pain MET  2) Pt will be able to Ambulate on uneven terrain without pain or instability without her cane MET  3) Pt will be able to Ambulate >/= 2 miles without pain MET  4) Pt will demonstrate Knee flexion >/= 120 degrees to aid in sitting/squatting MET      Long Term Goals: To be accomplished in 8 weeks:  1) Pt will be able toNavigate 5 flights of stairs without pain MET  2) Pt will be able to Ambulate on an incline of 1- 2% without pain or instability without her cane MET  3) Pt will be able to Ambulate >/= 3 miles without pain   4) Pt will demonstrate Knee flexion >/= 125 degrees to aid in sitting/squatting MET        Other: progress 2-4 more sessions    G-Codes (GP)  Mobility  [id number] Current  CJ= 20-39%   Goal  CI= 1-19%      The severity rating is based on the FOTO Score score.     Danielle Echols 2017 11:21 AM    ________________________________________________________________________  NOTE TO PHYSICIAN:  Please complete the following and fax to: [facility]: Fax: [phone]. Katharine Cedeno Retain this original for your records. If you are unable to process this request in 24 hours, please contact our office.        ____ I have read the above report and request that my patient continue therapy with the following changes/special instructions:  ____ I have read the above report and request that my patient be discharged from therapy    Physician's Signature:_________________ Date:___________Time:__________

## 2017-06-27 ENCOUNTER — HOSPITAL ENCOUNTER (OUTPATIENT)
Dept: PHYSICAL THERAPY | Age: 77
Discharge: HOME | End: 2017-06-27
Payer: MEDICARE

## 2017-06-27 PROCEDURE — 97110 THERAPEUTIC EXERCISES: CPT | Performed by: PHYSICAL THERAPIST

## 2017-06-27 PROCEDURE — 97140 MANUAL THERAPY 1/> REGIONS: CPT | Performed by: PHYSICAL THERAPIST

## 2017-06-27 NOTE — PROGRESS NOTES
PT DAILY TREATMENT NOTE - [facility] 2-15    Patient Name: Leanne Akins  Date:2017  : 1940  [x]  Patient  Verified  Payor: Corrina Regalado / Plan: Keyur Roman PPO / Product Type: PPO /    In time:9:00a Out time:10:10a  Total Treatment Time (min): 70  Total Timed Codes (min): 70  1:1 Treatment Time ( only): 30   Visit #: 12    Treatment Area: Pain in left knee [M25.562]    SUBJECTIVE  Pain Level (0-10 scale): 1/10  Any medication changes, allergies to medications, adverse drug reactions, diagnosis change, or new procedure performed?: [x] No    [] Yes (see summary sheet for update)  Subjective functional status/changes:   [] No changes reported  Doing well. No new complaints, getting ready to travel a bunch in july    [address] [place] [address]  50 min Therapeutic Exercise: [x] See flow sheet :   Rationale: increase ROM, increase strength, improve coordination, improve balance and increase proprioception to improve the patients ability to return to hiking.       20 min Manual Therapy: manual PFJ mobilizations and posterior knee STM to address mobility and extension. Manual HS stretching. Rationale: decrease pain, incease ROM and increase tissue extensibility to improve the patients ability to return to hiking pain free.           With  [] TE  [] TA  [] neuro  [] other: Patient Education: [x] Review HEP   [] Progressed/Changed HEP based on:   [] positioning [] body mechanics [] transfers [] heat/ice application   [] other:                Other Objective/Functional Measures: 0/0/120 (prone passive) 0/0/126 (short sitting active)      Pain Level (0-10 scale) post treatment: 0/10      ASSESSMENT/Changes in Function:   Pt has made great progress since initial session and able to walk greater than 2 miles and one slight incline when she was hiking recently. She has progressed with stair climbing and has full extension and flexion to 125 degrees in sitting.    We continue to address balance for safety and endurance as her goal is to hike 5 miles. Patient will continue to benefit from skilled PT services to modify and progress therapeutic interventions, address functional mobility deficits, address ROM deficits, address strength deficits, analyze and address soft tissue restrictions, analyze and cue movement patterns, analyze and modify body mechanics/ergonomics, assess and modify postural abnormalities, address imbalance/dizziness and instruct in home and community integration to attain remaining goals.      [x] See Plan of Care  [] See progress note/recertification  [] See Discharge Summary      Progress towards goals / Updated goals:  Short Term Goals: To be accomplished in 4 weeks:  1) Pt will be able to Navigate 3 flights of stairs without pain MET  2) Pt will be able to Ambulate on uneven terrain without pain or instability without her cane MET  3) Pt will be able to Ambulate >/= 2 miles without pain MET  4) Pt will demonstrate Knee flexion >/= 120 degrees to aid in sitting/squatting MET      Long Term Goals:  To be accomplished in 8 weeks:  1) Pt will be able toNavigate 5 flights of stairs without pain MET  2) Pt will be able to Ambulate on an incline of 1- 2% without pain or instability without her cane MET  3) Pt will be able to Ambulate >/= 3 miles without pain   4) Pt will demonstrate Knee flexion >/= 125 degrees to aid in sitting/squatting MET  Frequency / Duration: Patient to be seen 2-3 times per week for 8 weeks.          PLAN  [] Upgrade activities as tolerated [x] Continue plan of care  [] Update interventions per flow sheet   [] Discharge due to:Jesse    Flores Wallss 6/27/2017  12:03 PM

## 2017-07-26 ENCOUNTER — HOSPITAL ENCOUNTER (OUTPATIENT)
Dept: PHYSICAL THERAPY | Age: 77
Discharge: HOME | End: 2017-07-26
Payer: MEDICARE

## 2017-07-26 PROCEDURE — G8979 MOBILITY GOAL STATUS: HCPCS | Performed by: PHYSICAL THERAPIST

## 2017-07-26 PROCEDURE — 97140 MANUAL THERAPY 1/> REGIONS: CPT | Performed by: PHYSICAL THERAPIST

## 2017-07-26 PROCEDURE — G8978 MOBILITY CURRENT STATUS: HCPCS | Performed by: PHYSICAL THERAPIST

## 2017-07-26 PROCEDURE — G8980 MOBILITY D/C STATUS: HCPCS | Performed by: PHYSICAL THERAPIST

## 2017-07-26 NOTE — PROGRESS NOTES
[facility]   [address]  [place], [address] [facility]  Phone: [phone] Fax: [phone]    Progress Note    Zoe Phelps Nnamdi1940                    Nicky Martinez MD              Provider # [id number]                                                                                                                                      Diagnosis: Pain in left knee [M25.562]  Onset Date: 3/21/17                                                            Visits from Start of Care: 13                                                          Missed Visits: 0  Start of Care: 4/27/17  Prior Level of Function: exercising/walking/hiking daily        Assessment:   Pt is progressing well with ROM, balance, strength and endurance. She has met her ROM goal and we continue to progress towards strength and endurance goals. 0/0/120 (prone passive) 0/0/127 (short sitting active). She is able to walk and hike in the mountains without complaints of pain. She is independent with HEP and has done great on her own for the past few weeks on vacation. She will be discharged at this time     The Plan of Care and following information is based on the patient's current status:     Progress towards goals / Updated goals:  Short Term Goals: To be accomplished in 4 weeks:  1) Pt will be able to Navigate 3 flights of stairs without pain MET  2) Pt will be able to Ambulate on uneven terrain without pain or instability without her cane MET  3) Pt will be able to Ambulate >/= 2 miles without pain MET  4) Pt will demonstrate Knee flexion >/= 120 degrees to aid in sitting/squatting MET      Long Term Goals:  To be accomplished in 8 weeks:  1) Pt will be able toNavigate 5 flights of stairs without pain MET   2) Pt will be able to Ambulate on an incline of 1- 2% without pain or instability without her cane MET  3) Pt will be able to Ambulate >/= 3 miles without pain MET  4) Pt will demonstrate Knee flexion >/= 125 degrees to aid in sitting/squatting MET     Problem List: decrease strength, impaired gait/ balance, decrease activity tolerance and decrease flexibility/ joint mobility     Treatment Plan: Therapeutic exercise, Neuromuscular re-education, Gait/balance training, Manual therapy, Patient education, Functional mobility training and Stair training.                           Patient Goal(s) has been updated and includes: Pt has MET all short term goals and LTGs.      G-Codes (GP)  Mobility  [id number] Current CI = 1-19%%   Goal  CI= 1-19%        The severity rating is based on the FOTO Score and clinical judgement. Nanette Bucky 7/26/2017 3:53 PM    ________________________________________________________________________  NOTE TO PHYSICIAN:  Please complete the following and fax to: [facility]: Fax: [phone]  . Retain this original for your records. If you are unable to process this request in 24 hours, please contact our office.        ____ I have read the above report and request that my patient continue therapy with the following changes/special instructions:  ____ I have read the above report and request that my patient be discharged from therapy    Physician's Signature:_________________ Date:___________Time:__________

## 2017-07-26 NOTE — PROGRESS NOTES
PT DAILY TREATMENT NOTE - [facility] 2-15    Patient Name: Alejandro Polk  Date:2017  : 1940  [x]  Patient  Verified  Payor: Chago Garcia / Plan: [place] B / Product Type: Medicare /    In time: 11:00a Out time:11:35a  Total Treatment Time (min): 35  Total Timed Codes (min): 35  1:1 Treatment Time ( [address] only): 25  Visit #: 13    Treatment Area: Pain in left knee [M25.562]    SUBJECTIVE  Pain Level (0-10 scale): 0/10  Any medication changes, allergies to medications, adverse drug reactions, diagnosis change, or new procedure performed?: [x] No    [] Yes (see summary sheet for update)  Subjective functional status/changes:   [] No changes reported  Doing really well. OBJECTIVE              10 min Therapeutic Exercise: [x] See flow sheet : BIKE   Rationale: increase ROM, increase strength, improve coordination, improve balance and increase proprioception to improve the patients ability to return to hiking.       25 min Manual Therapy: manual PFJ mobilizations and posterior knee STM to address mobility and extension. Manual HS stretching. Rationale: decrease pain, incease ROM and increase tissue extensibility to improve the patients ability to return to hiking pain free.           With  [] TE  [] TA  [] neuro  [] other: Patient Education: [x] Review HEP   [] Progressed/Changed HEP based on:   [] positioning [] body mechanics [] transfers [] heat/ice application   [] other:                Other Objective/Functional Measures: 0/0/120 (prone passive) 0/0/127 (short sitting active)      Pain Level (0-10 scale) post treatment: 0/10      ASSESSMENT/Changes in Function:   Pt has made great progress since initial session and able to walk greater than 3 miles and one slight incline when she was hiking in the mountains.   She has progressed with stair climbing and has full extension and flexion to 125 degrees in sitting.         [] See Plan of Care  [x] See progress note/recertification  [x] See Discharge Summary      Progress towards goals / Updated goals:  Short Term Goals: To be accomplished in 4 weeks:  1) Pt will be able to Navigate 3 flights of stairs without pain MET  2) Pt will be able to Ambulate on uneven terrain without pain or instability without her cane MET  3) Pt will be able to Ambulate >/= 2 miles without pain MET  4) Pt will demonstrate Knee flexion >/= 120 degrees to aid in sitting/squatting MET      Long Term Goals: To be accomplished in 8 weeks:  1) Pt will be able toNavigate 5 flights of stairs without pain MET  2) Pt will be able to Ambulate on an incline of 1- 2% without pain or instability without her cane MET  3) Pt will be able to Ambulate >/= 3 miles without pain  MET  4) Pt will demonstrate Knee flexion >/= 125 degrees to aid in sitting/squatting MET    Frequency / Duration: Patient to be seen 2-3 times per week for 8 weeks.          PLAN  [] Upgrade activities as tolerated [] Continue plan of care  [] Update interventions per flow sheet   [x] Discharge due to:MET all long term goals.      Marcia Lopez 7/26/2017  4:14 PM

## 2017-09-14 NOTE — ANCILLARY DISCHARGE INSTRUCTIONS
[facility]   [address]  [place], [address]  Phone: [phone] Fax: [phone]      Discharge Summary 2-15      Patient name: Anette Rodriguez  : 1940  Provider#: [id number]  Referral source: Bhupinder Saenz MD      Medical/Treatment Diagnosis: Pain in left knee [M25.562]     Prior Hospitalization: see medical history     Comorbidities: osteoporosis, OA  Prior Level of Function:hiking  Medications: Verified on Patient Summary List    Start of Care: 17      Onset Date:3/21/17  Visits from Start of Care: 12     Missed Visits: 1  Reporting Period :  17 to 17    Assessment:   Pt is progressing well with ROM, balance, strength and endurance. She has met her ROM goal and we continue to progress towards strength and endurance goals.  0/0/120 (prone passive) 0/0/127 (short sitting active). She is able to walk and hike in the mountains without complaints of pain. She is independent with HEP and has done great on her own for the past few weeks on vacation.  She will be discharged at this time  [address] and following information is based on the patient's current status:      Progress towards goals / Updated goals:  Short Term Goals: To be accomplished in 4 weeks:  1) Pt will be able to Navigate 3 flights of stairs without pain MET  2) Pt will be able to Ambulate on uneven terrain without pain or instability without her cane MET  3) Pt will be able to Ambulate >/= 2 miles without pain MET  4) Pt will demonstrate Knee flexion >/= 120 degrees to aid in sitting/squatting MET      Long Term Goals: To be accomplished in 8 weeks:  1) Pt will be able toNavigate 5 flights of stairs without pain MET   2) Pt will be able to Ambulate on an incline of 1- 2% without pain or instability without her cane MET  3) Pt will be able to Ambulate >/= 3 miles without pain MET  4) Pt will demonstrate Knee flexion >/= 125 degrees to aid in sitting/squatting MET      Problem List: decrease strength, impaired gait/ balance, decrease activity tolerance and decrease flexibility/ joint mobility      Treatment Plan: Therapeutic exercise, Neuromuscular re-education, Gait/balance training, Manual therapy, Patient education, Functional mobility training and Stair training.                            Patient Goal(s) has been updated and includes: Pt has MET all short term goals and LTGs.       G-Codes (GP)  Mobility   Current CI = 1-19%%   Goal  CI= 1-19% Discharge CI = 1-19%        The severity rating is based on the FOTO Score and clinical judgement.     RECOMMENDATIONS:  [x]Discontinue therapy: [x]Patient has reached or is progressing toward set goals     []Patient is non-compliant or has abdicated     []Due to lack of appreciable progress towards set goals    Qamar Valero 9/14/2017 11:57 AM

## 2018-02-05 ENCOUNTER — HOSPITAL ENCOUNTER (OUTPATIENT)
Dept: CT IMAGING | Age: 78
Discharge: HOME | End: 2018-02-05
Attending: INTERNAL MEDICINE
Payer: MEDICARE

## 2018-02-05 DIAGNOSIS — R10.9 ABDOMINAL PAIN, UNSPECIFIED ABDOMINAL LOCATION: ICD-10-CM

## 2018-02-05 PROCEDURE — 74011000258 HC RX REV CODE- 258: Performed by: INTERNAL MEDICINE

## 2018-02-05 PROCEDURE — 74177 CT ABD & PELVIS W/CONTRAST: CPT

## 2018-02-05 PROCEDURE — 74011636320 HC RX REV CODE- 636/320: Performed by: INTERNAL MEDICINE

## 2018-02-05 RX ORDER — SODIUM CHLORIDE 0.9 % (FLUSH) 0.9 %
10 SYRINGE (ML) INJECTION
Status: COMPLETED | OUTPATIENT
Start: 2018-02-05 | End: 2018-02-05

## 2018-02-05 RX ADMIN — IOPAMIDOL 100 ML: 755 INJECTION, SOLUTION INTRAVENOUS at 13:49

## 2018-02-05 RX ADMIN — SODIUM CHLORIDE 100 ML: 900 INJECTION, SOLUTION INTRAVENOUS at 13:49

## 2018-02-05 RX ADMIN — Medication 10 ML: at 13:49

## 2019-08-28 ENCOUNTER — HOSPITAL ENCOUNTER (OUTPATIENT)
Dept: MAMMOGRAPHY | Age: 79
Discharge: HOME | End: 2019-08-28
Attending: INTERNAL MEDICINE
Payer: MEDICARE

## 2019-08-28 DIAGNOSIS — Z12.31 SCREENING MAMMOGRAM, ENCOUNTER FOR: ICD-10-CM

## 2019-08-28 PROCEDURE — 77067 SCR MAMMO BI INCL CAD: CPT

## 2022-03-18 PROBLEM — M17.12 PRIMARY LOCALIZED OSTEOARTHRITIS OF LEFT KNEE: Status: ACTIVE | Noted: 2017-03-21

## 2023-05-12 RX ORDER — ONDANSETRON 4 MG/1
TABLET, ORALLY DISINTEGRATING ORAL EVERY 8 HOURS PRN
COMMUNITY
Start: 2014-11-24

## 2023-05-12 RX ORDER — PRAVASTATIN SODIUM 20 MG
1 TABLET ORAL NIGHTLY
COMMUNITY
Start: 2020-10-02

## 2023-05-12 RX ORDER — OMEPRAZOLE 20 MG/1
20 CAPSULE, DELAYED RELEASE ORAL DAILY
COMMUNITY

## 2023-05-12 RX ORDER — BUTALBITAL, ACETAMINOPHEN AND CAFFEINE 50; 325; 40 MG/1; MG/1; MG/1
TABLET ORAL
COMMUNITY
Start: 2021-07-27